# Patient Record
Sex: FEMALE | Race: WHITE | Employment: OTHER | URBAN - METROPOLITAN AREA
[De-identification: names, ages, dates, MRNs, and addresses within clinical notes are randomized per-mention and may not be internally consistent; named-entity substitution may affect disease eponyms.]

---

## 2020-05-27 ENCOUNTER — OFFICE VISIT (OUTPATIENT)
Dept: HEMATOLOGY/ONCOLOGY | Facility: HOSPITAL | Age: 75
End: 2020-05-27
Attending: INTERNAL MEDICINE
Payer: MEDICARE

## 2020-05-27 VITALS
HEART RATE: 71 BPM | RESPIRATION RATE: 16 BRPM | TEMPERATURE: 97 F | DIASTOLIC BLOOD PRESSURE: 64 MMHG | BODY MASS INDEX: 25.27 KG/M2 | SYSTOLIC BLOOD PRESSURE: 112 MMHG | WEIGHT: 161 LBS | HEIGHT: 67 IN | OXYGEN SATURATION: 97 %

## 2020-05-27 DIAGNOSIS — C82.90 FOLLICULAR LYMPHOMA, UNSPECIFIED FOLLICULAR LYMPHOMA TYPE, UNSPECIFIED BODY REGION (HCC): Primary | ICD-10-CM

## 2020-05-27 PROCEDURE — 99204 OFFICE O/P NEW MOD 45 MIN: CPT | Performed by: INTERNAL MEDICINE

## 2020-05-27 RX ORDER — CHOLECALCIFEROL (VITAMIN D3) 125 MCG
CAPSULE ORAL
COMMUNITY

## 2020-05-27 RX ORDER — ROSUVASTATIN CALCIUM 10 MG/1
TABLET, COATED ORAL
COMMUNITY

## 2020-05-27 RX ORDER — AMOXICILLIN 250 MG
CAPSULE ORAL
COMMUNITY
End: 2020-05-27 | Stop reason: ALTCHOICE

## 2020-05-28 NOTE — CONSULTS
[facility]    PATIENT'S NAME: [facility]   CONSULTING PHYSICIAN: Laura Liang.  Uzma Nuñez MD   PATIENT ACCOUNT #: [de-identified] LOCATION: [address] [place] [address] RECORD #: [id number] YOB: 1945   CONSULTATION DATE: 05/27/2020       CANCER CE peripheral adenopathy. She is active and independent in her activities of daily living. She denies any bruising or bleeding. She is otherwise without complaints.     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 She is relatively asymptomatic, however, does have a borderline anemia.   It is difficult to say how much this is from her bone marrow involvement gi

## 2020-06-02 ENCOUNTER — TELEPHONE (OUTPATIENT)
Dept: HEMATOLOGY/ONCOLOGY | Facility: HOSPITAL | Age: 75
End: 2020-06-02

## 2020-06-02 NOTE — TELEPHONE ENCOUNTER
Savannah Suarez called and wanted to let Chris Heard and Dr. Shelia Gonzalez know that she has decided to stay with her original Dr. She thanks the Dr for taking the time to see her and the diagnosis.

## 2021-07-12 PROBLEM — R25.2 CRAMPS OF LOWER EXTREMITY: Status: ACTIVE | Noted: 2019-06-17

## 2021-07-12 PROBLEM — Z86.010 HISTORY OF COLONIC POLYPS: Status: ACTIVE | Noted: 2020-06-30

## 2021-07-12 PROBLEM — M48.061 SPINAL STENOSIS OF LUMBAR REGION: Status: ACTIVE | Noted: 2021-02-11

## 2021-07-12 PROBLEM — E78.2 MIXED HYPERLIPIDEMIA: Status: ACTIVE | Noted: 2019-06-17

## 2021-07-12 PROBLEM — M25.511 PAIN IN JOINT OF RIGHT SHOULDER: Status: ACTIVE | Noted: 2019-02-25

## 2021-07-12 PROBLEM — M85.80 OSTEOPENIA: Status: ACTIVE | Noted: 2019-06-28

## 2021-07-12 PROBLEM — M79.604 PAIN OF RIGHT LOWER EXTREMITY: Status: ACTIVE | Noted: 2021-02-08

## 2021-07-12 PROBLEM — R31.9 HEMATURIA: Status: ACTIVE | Noted: 2019-06-11

## 2021-07-12 PROBLEM — M16.11 ARTHRITIS OF RIGHT HIP: Status: ACTIVE | Noted: 2021-05-10

## 2021-07-12 PROBLEM — I82.429 ILIAC VEIN THROMBOSIS (HCC): Status: ACTIVE | Noted: 2020-08-21

## 2021-07-12 PROBLEM — K11.8 MASS OF PAROTID GLAND: Status: ACTIVE | Noted: 2019-12-19

## 2021-07-12 PROBLEM — E55.9 VITAMIN D DEFICIENCY: Status: ACTIVE | Noted: 2019-06-17

## 2021-07-12 PROBLEM — C82.90 FOLLICULAR NON-HODGKIN'S LYMPHOMA (HCC): Status: ACTIVE | Noted: 2020-03-21

## 2021-07-12 PROBLEM — M16.11 PRIMARY OSTEOARTHRITIS OF RIGHT HIP: Status: ACTIVE | Noted: 2021-05-10

## 2021-07-12 PROBLEM — H92.09 PAIN OF EAR STRUCTURE: Status: ACTIVE | Noted: 2019-12-09

## 2021-07-12 PROBLEM — E53.8 COBALAMIN DEFICIENCY: Status: ACTIVE | Noted: 2019-06-17

## 2021-07-12 PROBLEM — K59.00 CONSTIPATION: Status: ACTIVE | Noted: 2021-05-10

## 2021-07-12 PROBLEM — K80.20 CHOLELITHIASIS WITHOUT OBSTRUCTION: Status: ACTIVE | Noted: 2021-04-13

## 2021-08-02 ENCOUNTER — LAB ENCOUNTER (OUTPATIENT)
Dept: LAB | Facility: HOSPITAL | Age: 76
End: 2021-08-02
Attending: ORTHOPAEDIC SURGERY
Payer: MEDICARE

## 2021-08-02 ENCOUNTER — HOSPITAL ENCOUNTER (OUTPATIENT)
Dept: GENERAL RADIOLOGY | Facility: HOSPITAL | Age: 76
Discharge: HOME | End: 2021-08-02
Attending: ORTHOPAEDIC SURGERY
Payer: MEDICARE

## 2021-08-02 DIAGNOSIS — Z01.818 PREOP TESTING: ICD-10-CM

## 2021-08-02 DIAGNOSIS — M16.11 PRIMARY OSTEOARTHRITIS OF RIGHT HIP: ICD-10-CM

## 2021-08-02 LAB
ALBUMIN SERPL-MCNC: 3.5 G/DL (ref 3.4–5)
ALBUMIN/GLOB SERPL: 0.9 {RATIO} (ref 1–2)
ALP LIVER SERPL-CCNC: 70 U/L
ALT SERPL-CCNC: 14 U/L
ANION GAP SERPL CALC-SCNC: 6 MMOL/L (ref 0–18)
ANTIBODY SCREEN: NEGATIVE
APTT PPP: 36.5 SECONDS (ref 23.2–35.3)
AST SERPL-CCNC: 9 U/L (ref 15–37)
BASOPHILS # BLD AUTO: 0.07 X10(3) UL (ref 0–0.2)
BASOPHILS NFR BLD AUTO: 0.6 %
BILIRUB SERPL-MCNC: 0.4 MG/DL (ref 0.1–2)
BILIRUB UR QL: NEGATIVE
BUN BLD-MCNC: 14 MG/DL (ref 7–18)
BUN/CREAT SERPL: 18.9 (ref 10–20)
CALCIUM BLD-MCNC: 9.9 MG/DL (ref 8.5–10.1)
CHLORIDE SERPL-SCNC: 108 MMOL/L (ref 98–112)
CO2 SERPL-SCNC: 26 MMOL/L (ref 21–32)
COLOR UR: YELLOW
CREAT BLD-MCNC: 0.74 MG/DL
DEPRECATED RDW RBC AUTO: 46.7 FL (ref 35.1–46.3)
EOSINOPHIL # BLD AUTO: 0.12 X10(3) UL (ref 0–0.7)
EOSINOPHIL NFR BLD AUTO: 1.1 %
ERYTHROCYTE [DISTWIDTH] IN BLOOD BY AUTOMATED COUNT: 16.7 % (ref 11–15)
GLOBULIN PLAS-MCNC: 4 G/DL (ref 2.8–4.4)
GLUCOSE BLD-MCNC: 101 MG/DL (ref 70–99)
GLUCOSE UR-MCNC: NEGATIVE MG/DL
HCT VFR BLD AUTO: 34 %
HGB BLD-MCNC: 10.6 G/DL
IMM GRANULOCYTES # BLD AUTO: 0.08 X10(3) UL (ref 0–1)
IMM GRANULOCYTES NFR BLD: 0.7 %
INR BLD: 1.14 (ref 0.9–1.2)
KETONES UR-MCNC: NEGATIVE MG/DL
LYMPHOCYTES # BLD AUTO: 1.97 X10(3) UL (ref 1–4)
LYMPHOCYTES NFR BLD AUTO: 18.2 %
M PROTEIN MFR SERPL ELPH: 7.5 G/DL (ref 6.4–8.2)
MCH RBC QN AUTO: 24.9 PG (ref 26–34)
MCHC RBC AUTO-ENTMCNC: 31.2 G/DL (ref 31–37)
MCV RBC AUTO: 79.8 FL
MONOCYTES # BLD AUTO: 0.79 X10(3) UL (ref 0.1–1)
MONOCYTES NFR BLD AUTO: 7.3 %
MRSA DNA SPEC QL NAA+PROBE: NEGATIVE
NEUTROPHILS # BLD AUTO: 7.78 X10 (3) UL (ref 1.5–7.7)
NEUTROPHILS # BLD AUTO: 7.78 X10(3) UL (ref 1.5–7.7)
NEUTROPHILS NFR BLD AUTO: 72.1 %
NITRITE UR QL STRIP.AUTO: NEGATIVE
OSMOLALITY SERPL CALC.SUM OF ELEC: 291 MOSM/KG (ref 275–295)
PATIENT FASTING Y/N/NP: YES
PH UR: 6 [PH] (ref 5–8)
PLATELET # BLD AUTO: 456 10(3)UL (ref 150–450)
POTASSIUM SERPL-SCNC: 4.3 MMOL/L (ref 3.5–5.1)
PROT UR-MCNC: 30 MG/DL
PROTHROMBIN TIME: 14.4 SECONDS (ref 11.8–14.5)
RBC # BLD AUTO: 4.26 X10(6)UL
RBC #/AREA URNS AUTO: >10 /HPF
RH BLOOD TYPE: POSITIVE
SODIUM SERPL-SCNC: 140 MMOL/L (ref 136–145)
SP GR UR STRIP: 1.02 (ref 1–1.03)
UROBILINOGEN UR STRIP-ACNC: 2
WBC # BLD AUTO: 10.8 X10(3) UL (ref 4–11)
WBC #/AREA URNS AUTO: >50 /HPF
WBC CLUMPS UR QL AUTO: PRESENT /HPF

## 2021-08-02 PROCEDURE — 87186 SC STD MICRODIL/AGAR DIL: CPT

## 2021-08-02 PROCEDURE — 87086 URINE CULTURE/COLONY COUNT: CPT

## 2021-08-02 PROCEDURE — 86850 RBC ANTIBODY SCREEN: CPT

## 2021-08-02 PROCEDURE — 85730 THROMBOPLASTIN TIME PARTIAL: CPT

## 2021-08-02 PROCEDURE — 86901 BLOOD TYPING SEROLOGIC RH(D): CPT

## 2021-08-02 PROCEDURE — 93010 ELECTROCARDIOGRAM REPORT: CPT | Performed by: ORTHOPAEDIC SURGERY

## 2021-08-02 PROCEDURE — 87641 MR-STAPH DNA AMP PROBE: CPT

## 2021-08-02 PROCEDURE — 71046 X-RAY EXAM CHEST 2 VIEWS: CPT | Performed by: ORTHOPAEDIC SURGERY

## 2021-08-02 PROCEDURE — 85025 COMPLETE CBC W/AUTO DIFF WBC: CPT

## 2021-08-02 PROCEDURE — 81001 URINALYSIS AUTO W/SCOPE: CPT

## 2021-08-02 PROCEDURE — 87077 CULTURE AEROBIC IDENTIFY: CPT

## 2021-08-02 PROCEDURE — 85610 PROTHROMBIN TIME: CPT

## 2021-08-02 PROCEDURE — 86900 BLOOD TYPING SEROLOGIC ABO: CPT

## 2021-08-02 PROCEDURE — 93005 ELECTROCARDIOGRAM TRACING: CPT

## 2021-08-02 PROCEDURE — 36415 COLL VENOUS BLD VENIPUNCTURE: CPT

## 2021-08-02 PROCEDURE — 80053 COMPREHEN METABOLIC PANEL: CPT

## 2021-08-10 NOTE — PAT NURSING NOTE
Called dr. Cheng Gutierrez to  [facility]amad[facility] Chew she sent already result of Urine culture(abnormal) to PMD.

## 2021-08-15 ENCOUNTER — LAB ENCOUNTER (OUTPATIENT)
Dept: LAB | Facility: HOSPITAL | Age: 76
DRG: 470 | End: 2021-08-15
Attending: ORTHOPAEDIC SURGERY
Payer: MEDICARE

## 2021-08-15 DIAGNOSIS — Z01.818 PREOP TESTING: ICD-10-CM

## 2021-08-16 LAB — SARS-COV-2 RNA RESP QL NAA+PROBE: NOT DETECTED

## 2021-08-17 ENCOUNTER — ANESTHESIA EVENT (OUTPATIENT)
Dept: SURGERY | Facility: HOSPITAL | Age: 76
DRG: 470 | End: 2021-08-17
Payer: MEDICARE

## 2021-08-18 ENCOUNTER — APPOINTMENT (OUTPATIENT)
Dept: GENERAL RADIOLOGY | Facility: HOSPITAL | Age: 76
DRG: 470 | End: 2021-08-18
Attending: ORTHOPAEDIC SURGERY
Payer: MEDICARE

## 2021-08-18 ENCOUNTER — ANESTHESIA (OUTPATIENT)
Dept: SURGERY | Facility: HOSPITAL | Age: 76
DRG: 470 | End: 2021-08-18
Payer: MEDICARE

## 2021-08-18 ENCOUNTER — HOSPITAL ENCOUNTER (INPATIENT)
Facility: HOSPITAL | Age: 76
LOS: 5 days | Discharge: NURSING, REHAB OR LONG-TERM CARE | DRG: 470 | End: 2021-08-23
Attending: ORTHOPAEDIC SURGERY | Admitting: ORTHOPAEDIC SURGERY
Payer: MEDICARE

## 2021-08-18 DIAGNOSIS — Z01.818 PREOP TESTING: Primary | ICD-10-CM

## 2021-08-18 PROBLEM — E78.5 HYPERLIPIDEMIA: Status: ACTIVE | Noted: 2019-06-17

## 2021-08-18 PROBLEM — M16.11 OSTEOARTHRITIS OF RIGHT HIP: Status: ACTIVE | Noted: 2021-05-10

## 2021-08-18 PROBLEM — M16.11 PRIMARY OSTEOARTHRITIS OF RIGHT HIP: Status: ACTIVE | Noted: 2021-08-18

## 2021-08-18 LAB
DEPRECATED RDW RBC AUTO: 53.8 FL (ref 35.1–46.3)
ERYTHROCYTE [DISTWIDTH] IN BLOOD BY AUTOMATED COUNT: 17.7 % (ref 11–15)
HCT VFR BLD AUTO: 31.6 %
HGB BLD-MCNC: 9.5 G/DL
MCH RBC QN AUTO: 24.9 PG (ref 26–34)
MCHC RBC AUTO-ENTMCNC: 30.1 G/DL (ref 31–37)
MCV RBC AUTO: 82.9 FL
PLATELET # BLD AUTO: 389 10(3)UL (ref 150–450)
RBC # BLD AUTO: 3.81 X10(6)UL
WBC # BLD AUTO: 12.3 X10(3) UL (ref 4–11)

## 2021-08-18 PROCEDURE — 0SR90JZ REPLACEMENT OF RIGHT HIP JOINT WITH SYNTHETIC SUBSTITUTE, OPEN APPROACH: ICD-10-PCS | Performed by: ORTHOPAEDIC SURGERY

## 2021-08-18 PROCEDURE — 73501 X-RAY EXAM HIP UNI 1 VIEW: CPT | Performed by: ORTHOPAEDIC SURGERY

## 2021-08-18 PROCEDURE — 99232 SBSQ HOSP IP/OBS MODERATE 35: CPT | Performed by: HOSPITALIST

## 2021-08-18 RX ORDER — OXYCODONE HYDROCHLORIDE 5 MG/1
2.5 TABLET ORAL EVERY 4 HOURS PRN
Status: DISCONTINUED | OUTPATIENT
Start: 2021-08-18 | End: 2021-08-23

## 2021-08-18 RX ORDER — DOCUSATE SODIUM 100 MG/1
100 CAPSULE, LIQUID FILLED ORAL 2 TIMES DAILY
Status: DISCONTINUED | OUTPATIENT
Start: 2021-08-18 | End: 2021-08-23

## 2021-08-18 RX ORDER — SENNOSIDES 8.6 MG
17.2 TABLET ORAL NIGHTLY
Status: DISCONTINUED | OUTPATIENT
Start: 2021-08-18 | End: 2021-08-23

## 2021-08-18 RX ORDER — EPHEDRINE SULFATE 50 MG/ML
INJECTION, SOLUTION INTRAVENOUS AS NEEDED
Status: DISCONTINUED | OUTPATIENT
Start: 2021-08-18 | End: 2021-08-18 | Stop reason: SURG

## 2021-08-18 RX ORDER — ACETAMINOPHEN 500 MG
1000 TABLET ORAL ONCE
Status: COMPLETED | OUTPATIENT
Start: 2021-08-18 | End: 2021-08-18

## 2021-08-18 RX ORDER — SODIUM CHLORIDE, SODIUM LACTATE, POTASSIUM CHLORIDE, CALCIUM CHLORIDE 600; 310; 30; 20 MG/100ML; MG/100ML; MG/100ML; MG/100ML
INJECTION, SOLUTION INTRAVENOUS CONTINUOUS
Status: DISCONTINUED | OUTPATIENT
Start: 2021-08-18 | End: 2021-08-23

## 2021-08-18 RX ORDER — HYDROCODONE BITARTRATE AND ACETAMINOPHEN 5; 325 MG/1; MG/1
1 TABLET ORAL AS NEEDED
Status: DISCONTINUED | OUTPATIENT
Start: 2021-08-18 | End: 2021-08-18 | Stop reason: HOSPADM

## 2021-08-18 RX ORDER — SODIUM PHOSPHATE, DIBASIC AND SODIUM PHOSPHATE, MONOBASIC 7; 19 G/133ML; G/133ML
1 ENEMA RECTAL ONCE AS NEEDED
Status: DISCONTINUED | OUTPATIENT
Start: 2021-08-18 | End: 2021-08-23

## 2021-08-18 RX ORDER — HYDROMORPHONE HYDROCHLORIDE 1 MG/ML
0.2 INJECTION, SOLUTION INTRAMUSCULAR; INTRAVENOUS; SUBCUTANEOUS EVERY 2 HOUR PRN
Status: DISCONTINUED | OUTPATIENT
Start: 2021-08-18 | End: 2021-08-23

## 2021-08-18 RX ORDER — FAMOTIDINE 10 MG/ML
20 INJECTION, SOLUTION INTRAVENOUS 2 TIMES DAILY
Status: DISCONTINUED | OUTPATIENT
Start: 2021-08-18 | End: 2021-08-22

## 2021-08-18 RX ORDER — HYDROMORPHONE HYDROCHLORIDE 1 MG/ML
0.6 INJECTION, SOLUTION INTRAMUSCULAR; INTRAVENOUS; SUBCUTANEOUS EVERY 5 MIN PRN
Status: DISCONTINUED | OUTPATIENT
Start: 2021-08-18 | End: 2021-08-18 | Stop reason: HOSPADM

## 2021-08-18 RX ORDER — ROCURONIUM BROMIDE 10 MG/ML
INJECTION, SOLUTION INTRAVENOUS AS NEEDED
Status: DISCONTINUED | OUTPATIENT
Start: 2021-08-18 | End: 2021-08-18 | Stop reason: SURG

## 2021-08-18 RX ORDER — PROCHLORPERAZINE EDISYLATE 5 MG/ML
10 INJECTION INTRAMUSCULAR; INTRAVENOUS EVERY 6 HOURS PRN
Status: ACTIVE | OUTPATIENT
Start: 2021-08-18 | End: 2021-08-20

## 2021-08-18 RX ORDER — ALPRAZOLAM 0.5 MG/1
0.25 TABLET ORAL 2 TIMES DAILY PRN
Status: DISCONTINUED | OUTPATIENT
Start: 2021-08-18 | End: 2021-08-23

## 2021-08-18 RX ORDER — OXYCODONE HYDROCHLORIDE 5 MG/1
5 TABLET ORAL EVERY 4 HOURS PRN
Status: DISCONTINUED | OUTPATIENT
Start: 2021-08-18 | End: 2021-08-23

## 2021-08-18 RX ORDER — HYDROMORPHONE HYDROCHLORIDE 1 MG/ML
0.2 INJECTION, SOLUTION INTRAMUSCULAR; INTRAVENOUS; SUBCUTANEOUS EVERY 5 MIN PRN
Status: DISCONTINUED | OUTPATIENT
Start: 2021-08-18 | End: 2021-08-18 | Stop reason: HOSPADM

## 2021-08-18 RX ORDER — GLYCOPYRROLATE 0.2 MG/ML
INJECTION, SOLUTION INTRAMUSCULAR; INTRAVENOUS AS NEEDED
Status: DISCONTINUED | OUTPATIENT
Start: 2021-08-18 | End: 2021-08-18 | Stop reason: SURG

## 2021-08-18 RX ORDER — MORPHINE SULFATE 4 MG/ML
4 INJECTION, SOLUTION INTRAMUSCULAR; INTRAVENOUS EVERY 10 MIN PRN
Status: DISCONTINUED | OUTPATIENT
Start: 2021-08-18 | End: 2021-08-18 | Stop reason: HOSPADM

## 2021-08-18 RX ORDER — MECLIZINE HYDROCHLORIDE 25 MG/1
25 TABLET ORAL 3 TIMES DAILY PRN
Status: DISCONTINUED | OUTPATIENT
Start: 2021-08-18 | End: 2021-08-23

## 2021-08-18 RX ORDER — ONDANSETRON 2 MG/ML
4 INJECTION INTRAMUSCULAR; INTRAVENOUS ONCE AS NEEDED
Status: COMPLETED | OUTPATIENT
Start: 2021-08-18 | End: 2021-08-18

## 2021-08-18 RX ORDER — LIDOCAINE HYDROCHLORIDE 10 MG/ML
INJECTION, SOLUTION EPIDURAL; INFILTRATION; INTRACAUDAL; PERINEURAL AS NEEDED
Status: DISCONTINUED | OUTPATIENT
Start: 2021-08-18 | End: 2021-08-18 | Stop reason: SURG

## 2021-08-18 RX ORDER — POLYETHYLENE GLYCOL 3350 17 G/17G
17 POWDER, FOR SOLUTION ORAL DAILY PRN
Status: DISCONTINUED | OUTPATIENT
Start: 2021-08-18 | End: 2021-08-23

## 2021-08-18 RX ORDER — DIPHENHYDRAMINE HYDROCHLORIDE 50 MG/ML
12.5 INJECTION INTRAMUSCULAR; INTRAVENOUS EVERY 4 HOURS PRN
Status: DISCONTINUED | OUTPATIENT
Start: 2021-08-18 | End: 2021-08-23

## 2021-08-18 RX ORDER — BISACODYL 10 MG
10 SUPPOSITORY, RECTAL RECTAL
Status: DISCONTINUED | OUTPATIENT
Start: 2021-08-18 | End: 2021-08-23

## 2021-08-18 RX ORDER — ZOLPIDEM TARTRATE 5 MG/1
5 TABLET ORAL NIGHTLY PRN
Status: DISCONTINUED | OUTPATIENT
Start: 2021-08-18 | End: 2021-08-23

## 2021-08-18 RX ORDER — ONDANSETRON 2 MG/ML
4 INJECTION INTRAMUSCULAR; INTRAVENOUS EVERY 4 HOURS PRN
Status: DISCONTINUED | OUTPATIENT
Start: 2021-08-18 | End: 2021-08-23

## 2021-08-18 RX ORDER — PROCHLORPERAZINE EDISYLATE 5 MG/ML
5 INJECTION INTRAMUSCULAR; INTRAVENOUS ONCE AS NEEDED
Status: DISCONTINUED | OUTPATIENT
Start: 2021-08-18 | End: 2021-08-18 | Stop reason: HOSPADM

## 2021-08-18 RX ORDER — FAMOTIDINE 20 MG/1
20 TABLET ORAL 2 TIMES DAILY
Status: DISCONTINUED | OUTPATIENT
Start: 2021-08-18 | End: 2021-08-22

## 2021-08-18 RX ORDER — ONDANSETRON 2 MG/ML
INJECTION INTRAMUSCULAR; INTRAVENOUS AS NEEDED
Status: DISCONTINUED | OUTPATIENT
Start: 2021-08-18 | End: 2021-08-18 | Stop reason: SURG

## 2021-08-18 RX ORDER — NEOSTIGMINE METHYLSULFATE 1 MG/ML
INJECTION INTRAVENOUS AS NEEDED
Status: DISCONTINUED | OUTPATIENT
Start: 2021-08-18 | End: 2021-08-18 | Stop reason: SURG

## 2021-08-18 RX ORDER — HYDROMORPHONE HYDROCHLORIDE 1 MG/ML
0.4 INJECTION, SOLUTION INTRAMUSCULAR; INTRAVENOUS; SUBCUTANEOUS EVERY 2 HOUR PRN
Status: DISCONTINUED | OUTPATIENT
Start: 2021-08-18 | End: 2021-08-23

## 2021-08-18 RX ORDER — ROSUVASTATIN CALCIUM 10 MG/1
10 TABLET, COATED ORAL NIGHTLY
Status: DISCONTINUED | OUTPATIENT
Start: 2021-08-18 | End: 2021-08-23

## 2021-08-18 RX ORDER — OXYCODONE HYDROCHLORIDE AND ACETAMINOPHEN 5; 325 MG/1; MG/1
2 TABLET ORAL EVERY 4 HOURS PRN
Status: DISCONTINUED | OUTPATIENT
Start: 2021-08-18 | End: 2021-08-23

## 2021-08-18 RX ORDER — HYDROCODONE BITARTRATE AND ACETAMINOPHEN 5; 325 MG/1; MG/1
2 TABLET ORAL AS NEEDED
Status: DISCONTINUED | OUTPATIENT
Start: 2021-08-18 | End: 2021-08-18 | Stop reason: HOSPADM

## 2021-08-18 RX ORDER — NALOXONE HYDROCHLORIDE 0.4 MG/ML
80 INJECTION, SOLUTION INTRAMUSCULAR; INTRAVENOUS; SUBCUTANEOUS AS NEEDED
Status: DISCONTINUED | OUTPATIENT
Start: 2021-08-18 | End: 2021-08-18 | Stop reason: HOSPADM

## 2021-08-18 RX ORDER — TRANEXAMIC ACID 10 MG/ML
INJECTION, SOLUTION INTRAVENOUS AS NEEDED
Status: DISCONTINUED | OUTPATIENT
Start: 2021-08-18 | End: 2021-08-18 | Stop reason: SURG

## 2021-08-18 RX ORDER — SODIUM CHLORIDE 9 MG/ML
INJECTION, SOLUTION INTRAVENOUS CONTINUOUS PRN
Status: DISCONTINUED | OUTPATIENT
Start: 2021-08-18 | End: 2021-08-18 | Stop reason: SURG

## 2021-08-18 RX ORDER — HALOPERIDOL 5 MG/ML
0.25 INJECTION INTRAMUSCULAR ONCE AS NEEDED
Status: DISCONTINUED | OUTPATIENT
Start: 2021-08-18 | End: 2021-08-18 | Stop reason: HOSPADM

## 2021-08-18 RX ORDER — CLINDAMYCIN PHOSPHATE 150 MG/ML
INJECTION, SOLUTION INTRAVENOUS AS NEEDED
Status: DISCONTINUED | OUTPATIENT
Start: 2021-08-18 | End: 2021-08-18 | Stop reason: SURG

## 2021-08-18 RX ORDER — SODIUM CHLORIDE, SODIUM LACTATE, POTASSIUM CHLORIDE, CALCIUM CHLORIDE 600; 310; 30; 20 MG/100ML; MG/100ML; MG/100ML; MG/100ML
INJECTION, SOLUTION INTRAVENOUS CONTINUOUS
Status: DISCONTINUED | OUTPATIENT
Start: 2021-08-18 | End: 2021-08-18 | Stop reason: HOSPADM

## 2021-08-18 RX ORDER — SODIUM CHLORIDE 9 MG/ML
INJECTION, SOLUTION INTRAVENOUS CONTINUOUS
Status: DISCONTINUED | OUTPATIENT
Start: 2021-08-18 | End: 2021-08-23

## 2021-08-18 RX ORDER — DIPHENHYDRAMINE HYDROCHLORIDE 50 MG/ML
25 INJECTION INTRAMUSCULAR; INTRAVENOUS ONCE AS NEEDED
Status: ACTIVE | OUTPATIENT
Start: 2021-08-18 | End: 2021-08-18

## 2021-08-18 RX ORDER — MORPHINE SULFATE 4 MG/ML
2 INJECTION, SOLUTION INTRAMUSCULAR; INTRAVENOUS EVERY 10 MIN PRN
Status: DISCONTINUED | OUTPATIENT
Start: 2021-08-18 | End: 2021-08-18 | Stop reason: HOSPADM

## 2021-08-18 RX ORDER — HYDROMORPHONE HYDROCHLORIDE 1 MG/ML
0.4 INJECTION, SOLUTION INTRAMUSCULAR; INTRAVENOUS; SUBCUTANEOUS EVERY 5 MIN PRN
Status: DISCONTINUED | OUTPATIENT
Start: 2021-08-18 | End: 2021-08-18 | Stop reason: HOSPADM

## 2021-08-18 RX ORDER — OXYCODONE HYDROCHLORIDE AND ACETAMINOPHEN 5; 325 MG/1; MG/1
1 TABLET ORAL EVERY 4 HOURS PRN
Status: DISCONTINUED | OUTPATIENT
Start: 2021-08-18 | End: 2021-08-23

## 2021-08-18 RX ORDER — MORPHINE SULFATE 10 MG/ML
6 INJECTION, SOLUTION INTRAMUSCULAR; INTRAVENOUS EVERY 10 MIN PRN
Status: DISCONTINUED | OUTPATIENT
Start: 2021-08-18 | End: 2021-08-18 | Stop reason: HOSPADM

## 2021-08-18 RX ORDER — DEXAMETHASONE SODIUM PHOSPHATE 4 MG/ML
VIAL (ML) INJECTION AS NEEDED
Status: DISCONTINUED | OUTPATIENT
Start: 2021-08-18 | End: 2021-08-18 | Stop reason: SURG

## 2021-08-18 RX ORDER — NALOXONE HYDROCHLORIDE 0.4 MG/ML
0.08 INJECTION, SOLUTION INTRAMUSCULAR; INTRAVENOUS; SUBCUTANEOUS
Status: DISCONTINUED | OUTPATIENT
Start: 2021-08-18 | End: 2021-08-23

## 2021-08-18 RX ORDER — ONDANSETRON 2 MG/ML
4 INJECTION INTRAMUSCULAR; INTRAVENOUS EVERY 6 HOURS PRN
Status: DISCONTINUED | OUTPATIENT
Start: 2021-08-18 | End: 2021-08-23

## 2021-08-18 RX ORDER — DIPHENHYDRAMINE HCL 25 MG
25 CAPSULE ORAL EVERY 4 HOURS PRN
Status: DISCONTINUED | OUTPATIENT
Start: 2021-08-18 | End: 2021-08-23

## 2021-08-18 RX ADMIN — CLINDAMYCIN PHOSPHATE 900 MG: 150 INJECTION, SOLUTION INTRAVENOUS at 08:00:00

## 2021-08-18 RX ADMIN — ONDANSETRON 4 MG: 2 INJECTION INTRAMUSCULAR; INTRAVENOUS at 09:44:00

## 2021-08-18 RX ADMIN — SODIUM CHLORIDE, SODIUM LACTATE, POTASSIUM CHLORIDE, CALCIUM CHLORIDE: 600; 310; 30; 20 INJECTION, SOLUTION INTRAVENOUS at 07:34:00

## 2021-08-18 RX ADMIN — ROCURONIUM BROMIDE 40 MG: 10 INJECTION, SOLUTION INTRAVENOUS at 07:41:00

## 2021-08-18 RX ADMIN — GLYCOPYRROLATE 0.5 MG: 0.2 INJECTION, SOLUTION INTRAMUSCULAR; INTRAVENOUS at 09:49:00

## 2021-08-18 RX ADMIN — EPHEDRINE SULFATE 5 MG: 50 INJECTION, SOLUTION INTRAVENOUS at 09:07:00

## 2021-08-18 RX ADMIN — TRANEXAMIC ACID 1000 MG: 10 INJECTION, SOLUTION INTRAVENOUS at 08:00:00

## 2021-08-18 RX ADMIN — DEXAMETHASONE SODIUM PHOSPHATE 4 MG: 4 MG/ML VIAL (ML) INJECTION at 07:45:00

## 2021-08-18 RX ADMIN — SODIUM CHLORIDE: 9 INJECTION, SOLUTION INTRAVENOUS at 10:08:00

## 2021-08-18 RX ADMIN — SODIUM CHLORIDE: 9 INJECTION, SOLUTION INTRAVENOUS at 07:44:00

## 2021-08-18 RX ADMIN — NEOSTIGMINE METHYLSULFATE 3 MG: 1 INJECTION INTRAVENOUS at 09:49:00

## 2021-08-18 RX ADMIN — LIDOCAINE HYDROCHLORIDE 100 MG: 10 INJECTION, SOLUTION EPIDURAL; INFILTRATION; INTRACAUDAL; PERINEURAL at 07:39:00

## 2021-08-18 RX ADMIN — TRANEXAMIC ACID 1000 MG: 10 INJECTION, SOLUTION INTRAVENOUS at 09:44:00

## 2021-08-18 NOTE — PROGRESS NOTES
During registration Pt requested a  visit . During that visit the pt shared that she was not feeling well . She elaborated on feeling exhausted, dizzy and having a headache. All of her concerns were in the normal range in regards to the type of surg

## 2021-08-18 NOTE — ANESTHESIA PROCEDURE NOTES
Peripheral IV  Date/Time: 8/18/2021 7:45 AM  Inserted by: Everett Calderon, CRNA    Placement  Needle size: 20 G  Laterality: right  Location: forearm  Local anesthetic: none  Site prep: alcohol  Technique: anatomical landmarks  Attempts: 1

## 2021-08-18 NOTE — ANESTHESIA POSTPROCEDURE EVALUATION
Patient: Jarad Hernández    Procedure Summary     Date: 08/18/21 Room / Location: [address] [place] [address] MAIN OR 12 / [address] [place] [address] MAIN OR    Anesthesia Start: 0214 Anesthesia Stop: 7105    Procedure: right total hip arthroplasty (Right Hip) Diagnosis: (osteoathritis right hip)    S

## 2021-08-18 NOTE — CM/SW NOTE
MARIANA received MDO for Surgery     SW received email from [facility] stating pt is pre ortho arranged with [facility] prior to surgery by the surgeon's office.      PT/OT recommendations are Home Health PT/OT      [facility] will follow up post op to discuss Oni GAVIN

## 2021-08-18 NOTE — PHYSICAL THERAPY NOTE
PT Orders received and pt chart reviewed. Per RN, pt is dizzy and not appropriate for PT evaluation at this time. Will check back later this PM for appropriateness.  Thank you,    Yolanda Berry, PT  8/18/2021

## 2021-08-18 NOTE — PLAN OF CARE
New admit to shift. Patient a/o x4. Vital signs stable, cms intact. Room air. Complaining of dizziness, headache and nausea. MD Aware. Antivert given. No relief. MD Aware. No complaints of pain. PCA pump running. Patient educated on how to use PCA.  Current

## 2021-08-18 NOTE — ANESTHESIA PROCEDURE NOTES
Airway  Date/Time: 8/18/2021 7:42 AM  Urgency: Elective    Airway not difficult    General Information and Staff    Patient location during procedure: OR  Anesthesiologist: Lg Escalante MD  Resident/CRNA: Mirtha Calderon CRNA  Performed: CRNA     Indic

## 2021-08-18 NOTE — PROGRESS NOTES
[facility]OSP - [facility]    Progress Note    Drema Sánchez Patient Status:  Inpatient    1945 MRN [id number]   Location [address] UNIT Attending Margie Rios MD   Hosp Day # 0 PCP Alan Lawson MD 08/02/2021    HGB 10.6 (L) 08/02/2021    HCT 34.0 (L) 08/02/2021    .0 (H) 08/02/2021    CREATSERUM 0.74 08/02/2021    BUN 14 08/02/2021     08/02/2021    K 4.3 08/02/2021     08/02/2021    CO2 26.0 08/02/2021     (H) 08/02/2021

## 2021-08-18 NOTE — ANESTHESIA PREPROCEDURE EVALUATION
Anesthesia PreOp Note    HPI:     Toan Beach is a 66year old female who presents for preoperative consultation requested by: Karuna Rodriguez MD    Date of Surgery: 8/18/2021    Procedure(s):  right total hip arthroplasty  Indication: osteoathriti 2 (two) times daily. , Disp: , Rfl:   acetaminophen 500 MG Oral Tab, Take 500 mg by mouth every 6 (six) hours as needed for Pain., Disp: , Rfl:   Rosuvastatin Calcium 10 MG Oral Tab, rosuvastatin 10 mg tablet   TAKE ONE TABLET BY MOUTH ONE TIME DAILY, Disp: Social History Narrative      Not on file    Social Determinants of Health  Financial Resource Strain:       Difficulty of Paying Living Expenses:   Food Insecurity:       Worried About Running Out of Food in the Last Year:       Ran Out of Food in the [facility] asthma  Cardiovascular   (-) hypertension, CAD, CHF    ECG reviewed  Rhythm: regular  Rate: normal  ROS comment: Chronic RLE DVT.  On eliquis, last dose Sunday pm (<72 hrs)    Neuro/Psych - negative ROS   (-) CVA    GI/Hepatic/Renal - negative ROS     Endo/

## 2021-08-18 NOTE — H&P
HPI:   Cathie Skylar:  Migel Rosales presents is a 25-year-old white female who is here for second opinion she has been experiencing progressive pain and stiffness of her right hip and groin area for the past 6 months without any history of any identifiable t intake form and reviewed by me today with pertinent positives and negatives listed in the HPI. GENERAL HEALTH: denies weight loss of significance, denies fatigue, fever or chills.   HENT: denies blurry vision, double vision, hearing ailments, dysphagia, od and regular. · There is definitive decreased range of motion in the right hip, particularly with regards to hip flexion, abduction and rotation. Minimal decreased range of motion noted on the contralateral hip.       · No focal tenderness about the hip HIP W OR WO PELVIS 2 OR 3 VIEWS, RIGHT (CPT=73502)     The patient’s symptoms have been  progressive, unresponsive to conservative treatment, and interfering with the ability to perform their normal activities of daily living.  An extensive discussion regar Plavix, fish oil, steroids, etc.); neurovascular injury including sciatic stretching injury and footdrop; the need and risk of a blood transfusion despite the use of autologous drain and/or cell saver; the potential need for prolonged protective weightbear anti-inflammatory medication and activities as tolerated was recommended. Digital transcription software was utilized to produce this note. The note was proofread for content only. Typographical errors may remain.

## 2021-08-18 NOTE — BRIEF OP NOTE
[facility] [address]   Brief Op Note    Patients Name: Cathie Cheng  Attending Physician: Charli Manley MD  Operating Physician: Michell You MD  CSN: [id number]     Location:  OR  MRN: [id number]    YOB: 1945  Admis

## 2021-08-18 NOTE — PHYSICAL THERAPY NOTE
PHYSICAL THERAPY HIP EVALUATION - INPATIENT     Room Number: 407/407-A  Evaluation Date: 8/18/2021  Type of Evaluation: Initial  Physician Order: PT Eval and Treat    Presenting Problem: s/p R REANNA - posterior approach  Reason for Therapy: Mobility Dysfunct patient's Approx Degree of Impairment: 46.58% has been calculated based on documentation in the [facility] '6 clicks' Inpatient Basic Mobility Short Form.   Research supports that patients with this level of impairment may benefit from [facility] PT with 24 hr assist fr abduction pillow;Limb alert - right  Fall Risk: Standard fall risk    WEIGHT BEARING RESTRICTION  Weight Bearing Restriction: R lower extremity        R Lower Extremity: Weight Bearing as Tolerated       PAIN ASSESSMENT  Ratin  Location: R hip  Managem tested       Exercise/Education Provided:  Bed mobility  Body mechanics  Functional activity tolerated  Posture  REANNA precautions    Patient End of Session: In bed;Needs met;Call light within reach;RN aware of session/findings;Bracing education provided; All

## 2021-08-18 NOTE — OPERATIVE REPORT
Operative Report     Pre-Operative Diagnosis: osteoathritis right hip     Post-Operative Diagnosis: Same as above.     Procedure Performed: right total hip arthroplasty      Anesthesia: General     Assistants: Surgical Assistant.: Susan Mackey hip.  Her work-up included an MRI OF THE RIGHT HIP performed at [facility] on Zee 3, 2021 the following radiologist impression:  1.  Severe right hip osteoarthritis  2.  Mild to moderate left hip osteoarthritis  3.  Bilateral hamstring groin tendin explained) requiring potential heel lift (with increased incidence in patients with scoliosis, contralateral arthritis and/or total joint arthroplasty, pelvic obliquity, etc.); the 2-8% incidence of hip dislocation, even at tertiary centers; mechanical loo loosening and/your periprosthetic fractures given history of osteoporosis was conveyed.  Moreover, it was explained that the hip replacement surgery would address specifically any pain emanating from her advanced osteoarthritis of the hip.  She may experie hemostasis was obtained with Bovie electrocautery.  The tensor fascia esvin and gluteus latonia fascia were incised.  The sciatic nerve was identified and protected throughout the case, and a self-retaining Charnley retractor was inserted.  The dissection w then turned to the femur, where a box osteotome was utilized, followed by a Charnley awl and a trochanteric reamer.  Sequential broaching was begun with a size 0 in the appropriate anteversion and progressing to a size 7 and an excellent tight fit was noted enough blood loss occurred for reinfusion with a approximately 150 cc estimated blood loss. . Tranexamic acid was begun at the beginning of the case and given again at this time and after copious irrigation with the Pulsavac antibiotic irrigation system, st

## 2021-08-19 LAB
ANION GAP SERPL CALC-SCNC: 6 MMOL/L (ref 0–18)
BASOPHILS # BLD AUTO: 0.02 X10(3) UL (ref 0–0.2)
BASOPHILS NFR BLD AUTO: 0.2 %
BUN BLD-MCNC: 13 MG/DL (ref 7–18)
BUN/CREAT SERPL: 12.6 (ref 10–20)
CALCIUM BLD-MCNC: 8.5 MG/DL (ref 8.5–10.1)
CHLORIDE SERPL-SCNC: 106 MMOL/L (ref 98–112)
CO2 SERPL-SCNC: 26 MMOL/L (ref 21–32)
CREAT BLD-MCNC: 1.03 MG/DL
DEPRECATED RDW RBC AUTO: 54.3 FL (ref 35.1–46.3)
EOSINOPHIL # BLD AUTO: 0.01 X10(3) UL (ref 0–0.7)
EOSINOPHIL NFR BLD AUTO: 0.1 %
ERYTHROCYTE [DISTWIDTH] IN BLOOD BY AUTOMATED COUNT: 18.4 % (ref 11–15)
GLUCOSE BLD-MCNC: 100 MG/DL (ref 70–99)
HCT VFR BLD AUTO: 27 %
HCT VFR BLD AUTO: 27.3 %
HGB BLD-MCNC: 8.2 G/DL
HGB BLD-MCNC: 8.6 G/DL
IMM GRANULOCYTES # BLD AUTO: 0.08 X10(3) UL (ref 0–1)
IMM GRANULOCYTES NFR BLD: 0.7 %
LYMPHOCYTES # BLD AUTO: 1.04 X10(3) UL (ref 1–4)
LYMPHOCYTES NFR BLD AUTO: 9.7 %
MCH RBC QN AUTO: 25.4 PG (ref 26–34)
MCHC RBC AUTO-ENTMCNC: 31.5 G/DL (ref 31–37)
MCV RBC AUTO: 80.8 FL
MONOCYTES # BLD AUTO: 1.03 X10(3) UL (ref 0.1–1)
MONOCYTES NFR BLD AUTO: 9.6 %
NEUTROPHILS # BLD AUTO: 8.57 X10 (3) UL (ref 1.5–7.7)
NEUTROPHILS # BLD AUTO: 8.57 X10(3) UL (ref 1.5–7.7)
NEUTROPHILS NFR BLD AUTO: 79.7 %
OSMOLALITY SERPL CALC.SUM OF ELEC: 286 MOSM/KG (ref 275–295)
PLATELET # BLD AUTO: 332 10(3)UL (ref 150–450)
POTASSIUM SERPL-SCNC: 4.4 MMOL/L (ref 3.5–5.1)
RBC # BLD AUTO: 3.38 X10(6)UL
SODIUM SERPL-SCNC: 138 MMOL/L (ref 136–145)
WBC # BLD AUTO: 10.8 X10(3) UL (ref 4–11)

## 2021-08-19 PROCEDURE — 99233 SBSQ HOSP IP/OBS HIGH 50: CPT | Performed by: HOSPITALIST

## 2021-08-19 RX ORDER — ACETAMINOPHEN 500 MG
1000 TABLET ORAL EVERY 8 HOURS PRN
Status: DISCONTINUED | OUTPATIENT
Start: 2021-08-19 | End: 2021-08-23

## 2021-08-19 NOTE — PLAN OF CARE
Patient a/o x4. Vital signs stable, cms intact. Room air. No signs of acute distress. Patient ambulating 1 assist with walker. General diet. Appetite decreased. MD Aware. Nutritional supplement given. Complaining of nausea. Denied medication.  PCA pump disc applicable  - Encourage broncho-pulmonary hygiene including cough, deep breathe, Incentive Spirometry  - Assess the need for suctioning and perform as needed  - Assess and instruct to report SOB or any respiratory difficulty  - Respiratory Therapy support fall injury  Description: INTERVENTIONS:  - Assess pt frequently for physical needs  - Identify cognitive and physical deficits and behaviors that affect risk of falls.   - Markesan fall precautions as indicated by assessment.  - Educate pt/family on patie

## 2021-08-19 NOTE — PROGRESS NOTES
[facility]    Progress Note    Octavio Watts Patient Status:  Inpatient    1945 MRN [id number]   Location [facility] 4W/SW/SE Attending Marcos Simmons MD   Hosp Day # 1 PCP Jayy Boyer MD     Date of Admission Oral, Daily PRN  bisacodyl (DULCOLAX) rectal suppository 10 mg, 10 mg, Rectal, Daily PRN  Fleet Enema (FLEET) 7-19 GM/118ML enema 133 mL, 1 enema, Rectal, Once PRN  ondansetron (ZOFRAN) injection 4 mg, 4 mg, Intravenous, Q4H PRN  Prochlorperazine Edisylate Medical History:  Past Medical History:   Diagnosis Date   • Diverticulosis of large intestine    • High cholesterol    • Non-Hodgkin lymphoma ([facility] [address]    • Osteoarthritis    • Visual impairment     glases      Surgical History:  Past Surgical History:   Proc components in anatomic position. 2. No adverse features. No acute fracture dislocation.       Dictated by (CST): Jessica Escalante MD on 8/18/2021 at 12:07 PM     Finalized by (CST): Jessica Escalante MD on 8/18/2021 at 12:09 PM                 ASSESSME

## 2021-08-19 NOTE — PHYSICAL THERAPY NOTE
PHYSICAL THERAPY HIP TREATMENT NOTE - INPATIENT    Room Number: 407/407-A       Number of Visits to Meet Established Goals: 0    Presenting Problem: s/p R REANNA - posterior approach    Problem List  Principal Problem:    Osteoarthritis of right hip  Active P Automatic  Patient Position: Sitting    O2 WALK       AM-PAC '6-Clicks' INPATIENT SHORT FORM - BASIC MOBILITY  How much difficulty does the patient currently have. ..  -   Turning over in bed (including adjusting bedclothes, sheets and blankets)?: A Little Goal #4   Current Status NT   Goal #5 Patient verbalizes and/or demonstrates all precautions and safety concerns independently   Goal #5   Current Status In progress   Goal #6 Patient independently performs home exercise program for ROM/strengthening per

## 2021-08-19 NOTE — OCCUPATIONAL THERAPY NOTE
OCCUPATIONAL THERAPY EVALUATION - INPATIENT      Room Number: 407/407-A  Evaluation Date: 8/19/2021  Type of Evaluation: Initial  Presenting Problem:  (R franco)    Physician Order: IP Consult to Occupational Therapy  Reason for Therapy: ADL/IADL Dysfunction supports that patients with this level of impairment may benefit from return to home w/ HH OT.    DISCHARGE RECOMMENDATIONS  OT Discharge Recommendations: Home with home health PT/OT;24 hour care/supervision  OT Device Recommendations: TBD    PLAN  OT Lana 4    RANGE OF MOTION   Upper extremity ROM is within functional limits     STRENGTH ASSESSMENT  Upper extremity strength is within functional limits     ACTIVITIES OF DAILY LIVING ASSESSMENT  AM-PAC ‘6-Clicks’ Inpatient Daily Activity Short Form  How much

## 2021-08-19 NOTE — DIETARY NOTE
ADULT NUTRITION INITIAL ASSESSMENT    Pt is at high nutrition risk. Pt meets severe malnutrition criteria.       CRITERIA FOR MALNUTRITION DIAGNOSIS:  Criteria for severe malnutrition diagnosis: chronic illness related to wt loss greater than 10% in 6 lori Nightly   • docusate sodium  100 mg Oral BID   • apixaban  2.5 mg Oral [email or web address]   • famoTIDine  20 mg Oral BID    Or   • famoTIDine  20 mg Intravenous BID     LABS: reviewed  Recent Labs     08/19/21  0653   *   BUN 13   CREATSERUM 1.03*   CA 8 Nutrition education: Discussed importance of increased food intake with supplementing with ONS vs using ONS as meal replacement    - Coordination of nutrition care: collaboration with other providers  - Discharge and transfer of nutrition care to [facility]

## 2021-08-19 NOTE — PROGRESS NOTES
[facility] [facility] - [facility]    Progress Note    Jr Garrido Patient Status:  Inpatient    1945 MRN [id number]   Location [facility] 4W/SW/SE Attending William Mosqueda MD   Hosp Day # 1 PCP Grecia Richmond MD       Subjective:    Lacho apixaban  2.5 mg Oral [email or web address]   • famoTIDine  20 mg Oral BID    Or   • famoTIDine  20 mg Intravenous BID     acetaminophen, sodium chloride 0.9%, PEG 3350, magnesium hydroxide, bisacodyl, Fleet Enema, ondansetron, Prochlorperazine Edisylate, diphenhy

## 2021-08-20 LAB
BASOPHILS # BLD AUTO: 0.02 X10(3) UL (ref 0–0.2)
BASOPHILS NFR BLD AUTO: 0.2 %
DEPRECATED RDW RBC AUTO: 54.7 FL (ref 35.1–46.3)
EOSINOPHIL # BLD AUTO: 0.04 X10(3) UL (ref 0–0.7)
EOSINOPHIL NFR BLD AUTO: 0.5 %
ERYTHROCYTE [DISTWIDTH] IN BLOOD BY AUTOMATED COUNT: 18.5 % (ref 11–15)
HCT VFR BLD AUTO: 27.5 %
HGB BLD-MCNC: 8.5 G/DL
IMM GRANULOCYTES # BLD AUTO: 0.06 X10(3) UL (ref 0–1)
IMM GRANULOCYTES NFR BLD: 0.7 %
LYMPHOCYTES # BLD AUTO: 1.09 X10(3) UL (ref 1–4)
LYMPHOCYTES NFR BLD AUTO: 12.9 %
MCH RBC QN AUTO: 25.1 PG (ref 26–34)
MCHC RBC AUTO-ENTMCNC: 30.9 G/DL (ref 31–37)
MCV RBC AUTO: 81.1 FL
MONOCYTES # BLD AUTO: 0.89 X10(3) UL (ref 0.1–1)
MONOCYTES NFR BLD AUTO: 10.5 %
NEUTROPHILS # BLD AUTO: 6.38 X10 (3) UL (ref 1.5–7.7)
NEUTROPHILS # BLD AUTO: 6.38 X10(3) UL (ref 1.5–7.7)
NEUTROPHILS NFR BLD AUTO: 75.2 %
PLATELET # BLD AUTO: 338 10(3)UL (ref 150–450)
RBC # BLD AUTO: 3.39 X10(6)UL
WBC # BLD AUTO: 8.5 X10(3) UL (ref 4–11)

## 2021-08-20 PROCEDURE — 99233 SBSQ HOSP IP/OBS HIGH 50: CPT | Performed by: HOSPITALIST

## 2021-08-20 NOTE — PROGRESS NOTES
[facility]    Progress Note    Annamary Skylar Patient Status:  Inpatient    1945 MRN [id number]   Location [facility] 4W/SW/SE Attending Carlos Boone MD   Hosp Day # 2 PCP Iram Bravo MD       Subjective:    Lacho • famoTIDine  20 mg Oral BID    Or   • famoTIDine  20 mg Intravenous BID     acetaminophen, PEG 3350, magnesium hydroxide, bisacodyl, Fleet Enema, ondansetron, Prochlorperazine Edisylate, diphenhydrAMINE **OR** diphenhydrAMINE, oxyCODONE HCl **OR** oxyCO

## 2021-08-20 NOTE — PHYSICAL THERAPY NOTE
PHYSICAL THERAPY HIP TREATMENT NOTE - INPATIENT    Room Number: 407/407-A       Number of Visits to Meet Established Goals: 0    Presenting Problem: s/p R REANNA - posterior approach    Problem List  Principal Problem:    Osteoarthritis of right hip  Active P Sitting: Good  Static Standing: Not tested  Dynamic Standing: Not tested  ACTIVITY TOLERANCE                         O2 WALK       AM-PAC '6-Clicks' INPATIENT SHORT FORM - BASIC MOBILITY  How much difficulty does the patient currently have. ..  -   Turning Goal #4 Patient will negotiate 7 stairs/one curb w/ assistive device and supervision   Goal #4   Current Status NT   Goal #5 Patient verbalizes and/or demonstrates all precautions and safety concerns independently   Goal #5   Current Status In progress

## 2021-08-20 NOTE — PROGRESS NOTES
[facility]    Progress Note    Denae Martínez Patient Status:  Inpatient    1945 MRN [id number]   Location [facility] 4W/SW/SE Attending Ngoc Alcazar MD   Hosp Day # 2 PCP Dora Lerner MD     Date of Admission: 100 mg, Oral, BID  PEG 3350 (MIRALAX) powder packet 17 g, 17 g, Oral, Daily PRN  magnesium hydroxide (MILK OF MAGNESIA) 400 MG/5ML suspension 30 mL, 30 mL, Oral, Daily PRN  bisacodyl (DULCOLAX) rectal suppository 10 mg, 10 mg, Rectal, Daily PRN  Fleet Enem 2 tablet, Oral, Q4H PRN  Meclizine HCl (ANTIVERT) tab 25 mg, 25 mg, Oral, TID PRN  ALPRAZolam (XANAX) tab 0.25 mg, 0.25 mg, Oral, BID PRN        HISTORY:  Past Medical History:  Past Medical History:   Diagnosis Date   • Diverticulosis of large intestine 08/02/2021    PTT 36.5 (H) 08/02/2021    INR 1.14 08/02/2021    PTP 14.4 08/02/2021         IMAGING:   No results found.          ASSESSMENT AND PLAN:   Osteoarthritis of right hip--status post right total hip arthroplasty          Follicular non-Hodgkin's

## 2021-08-20 NOTE — PLAN OF CARE
Problem: Patient Centered Care  Goal: Patient preferences are identified and integrated in the patient's plan of care  Description: Interventions:  - What would you like us to know as we care for you?  Patient lives with her  at home and he's her s INTERVENTIONS:  - Assess for changes in respiratory status  - Assess for changes in mentation and behavior  - Position to facilitate oxygenation and minimize respiratory effort  - Oxygen supplementation based on oxygen saturation or ABGs  - Provide Smoking relaxation techniques  - Monitor for opioid side effects  - Notify MD/LIP if interventions unsuccessful or patient reports new pain  - Anticipate increased pain with activity and pre-medicate as appropriate  Outcome: Progressing     Problem: SAFETY ADULT - from staff. Patient takes oral medications for pain. Patient is voiding well, had small pellets as bowel movement.   Patient will either go home with [place] [id number] or rehab depending on how she does with PT.

## 2021-08-21 PROCEDURE — 99233 SBSQ HOSP IP/OBS HIGH 50: CPT | Performed by: HOSPITALIST

## 2021-08-21 RX ORDER — HYDROCODONE BITARTRATE AND ACETAMINOPHEN 7.5; 325 MG/1; MG/1
1 TABLET ORAL EVERY 6 HOURS PRN
Qty: 45 TABLET | Refills: 0 | Status: SHIPPED | OUTPATIENT
Start: 2021-08-21 | End: 2021-08-23

## 2021-08-21 NOTE — PROGRESS NOTES
[facility]    Progress Note    Manus Balzarine Patient Status:  Inpatient    1945 MRN [id number]   Location [facility] 4W/SW/SE Attending Chandni Manjarrez MD   Hosp Day # 3 PCP Jazmin Ralph MD     Date of Admission: Nightly  docusate sodium (COLACE) cap 100 mg, 100 mg, Oral, BID  PEG 3350 (MIRALAX) powder packet 17 g, 17 g, Oral, Daily PRN  magnesium hydroxide (MILK OF MAGNESIA) 400 MG/5ML suspension 30 mL, 30 mL, Oral, Daily PRN  bisacodyl (DULCOLAX) rectal supposito Or  oxyCODONE-acetaminophen (PERCOCET) 5-325 MG per tab 2 tablet, 2 tablet, Oral, Q4H PRN  Meclizine HCl (ANTIVERT) tab 25 mg, 25 mg, Oral, TID PRN  ALPRAZolam (XANAX) tab 0.25 mg, 0.25 mg, Oral, BID PRN        HISTORY:  Past Medical History:  Past Medical 08/02/2021         IMAGING:   No results found.          ASSESSMENT AND PLAN:   Osteoarthritis of right hip--status post right total hip arthroplasty          Follicular non-Hodgkin's lymphoma ([facility] [address]       Hyperlipidemia  ------------------------------------

## 2021-08-21 NOTE — PROGRESS NOTES
[place] [facility] - [facility]    Progress Note    Adamaris Dawson Patient Status:  Inpatient    1945 MRN [id number]   Location [facility] 4W/SW/SE Attending Melanie Eugene MD   Hosp Day # 3 PCP Jaky Kothari MD       Subjective:    Lacho diphenhydrAMINE, ondansetron, oxyCODONE-acetaminophen **OR** oxyCODONE-acetaminophen, Meclizine HCl, ALPRAZolam      Assessment and Plan:      Osteoarthritis of right hip  S/P R HIP ARTHROPLASTY POD 3, PAIN CONTROL WITH TYLENOL AND OXY, NEURO VASCULAR CHEC

## 2021-08-21 NOTE — PLAN OF CARE
Aleta Story is alert and oriented. She is voiding well and purewick is in place. Her pain is managed with oral OxyIR as needed. She ambulates with the rolling walker and 1 assist. Alignment of hip is maintained with abductor pillow in place.  The bed is low and SAFETY ADULT - FALL  Goal: Free from fall injury  Description: INTERVENTIONS:  - Assess pt frequently for physical needs  - Identify cognitive and physical deficits and behaviors that affect risk of falls.   - Champion fall precautions as indicated by asse

## 2021-08-21 NOTE — PHYSICAL THERAPY NOTE
PHYSICAL THERAPY TREATMENT NOTE - INPATIENT     Room Number: 407/407-A       Presenting Problem: s/p R REANNA - posterior approach    Problem List  Principal Problem:    Osteoarthritis of right hip  Active Problems:    Follicular non-Hodgkin's lymphoma ([facility] [address] Static Sitting: Good  Dynamic Sitting: Good           Static Standing: Fair  Dynamic Standing: Fair -    ACTIVITY TOLERANCE  Pulse: 88  Heart Rate Source: Monitor  Resp: 18  BP: 126/72  BP Loca sit EOB @ level: modified independent   Goal #1   Current Status Min A   Goal #2 Patient is able to demonstrate transfers Sit to/from Stand at assistance level: modified independent     Goal #2  Current Status Min A   Goal #3 Patient is able to ambulate 30

## 2021-08-21 NOTE — CM/SW NOTE
8/21: Per Vanessa MOTA, patient stable for discharge & now requesting SNF placement. [facility] is patient's first choice. Initiated SNF referral via JORGE L Sorto.     Addendum 8/22/2021:    Spoke with patient's  & explained that [facility] do

## 2021-08-22 LAB
ANION GAP SERPL CALC-SCNC: 5 MMOL/L (ref 0–18)
BLOOD TYPE BARCODE: 6200
BUN BLD-MCNC: 12 MG/DL (ref 7–18)
BUN/CREAT SERPL: 11.3 (ref 10–20)
CALCIUM BLD-MCNC: 8.6 MG/DL (ref 8.5–10.1)
CHLORIDE SERPL-SCNC: 105 MMOL/L (ref 98–112)
CO2 SERPL-SCNC: 30 MMOL/L (ref 21–32)
CREAT BLD-MCNC: 1.06 MG/DL
DEPRECATED RDW RBC AUTO: 53.4 FL (ref 35.1–46.3)
ERYTHROCYTE [DISTWIDTH] IN BLOOD BY AUTOMATED COUNT: 17.9 % (ref 11–15)
GLUCOSE BLD-MCNC: 101 MG/DL (ref 70–99)
HCT VFR BLD AUTO: 27.4 %
HGB BLD-MCNC: 8.4 G/DL
MCH RBC QN AUTO: 25 PG (ref 26–34)
MCHC RBC AUTO-ENTMCNC: 30.7 G/DL (ref 31–37)
MCV RBC AUTO: 81.5 FL
OSMOLALITY SERPL CALC.SUM OF ELEC: 290 MOSM/KG (ref 275–295)
PLATELET # BLD AUTO: 328 10(3)UL (ref 150–450)
POTASSIUM SERPL-SCNC: 4.6 MMOL/L (ref 3.5–5.1)
RBC # BLD AUTO: 3.36 X10(6)UL
SODIUM SERPL-SCNC: 140 MMOL/L (ref 136–145)
WBC # BLD AUTO: 7.7 X10(3) UL (ref 4–11)

## 2021-08-22 PROCEDURE — 99233 SBSQ HOSP IP/OBS HIGH 50: CPT | Performed by: HOSPITALIST

## 2021-08-22 RX ORDER — FAMOTIDINE 10 MG/ML
20 INJECTION, SOLUTION INTRAVENOUS DAILY
Status: DISCONTINUED | OUTPATIENT
Start: 2021-08-23 | End: 2021-08-23

## 2021-08-22 RX ORDER — FAMOTIDINE 20 MG/1
20 TABLET ORAL DAILY
Status: DISCONTINUED | OUTPATIENT
Start: 2021-08-23 | End: 2021-08-23

## 2021-08-22 NOTE — PLAN OF CARE
Pt is alert and oriented. Reports no pain at this timeHer pain is managed with oral OxyIR as needed. She ambulates with the rolling walker and 1 assist. Alignment of hip is maintained with abductor pillow in place. The bed is low and locked.  Call light wit goal  Description: INTERVENTIONS:  - Encourage pt to monitor pain and request assistance  - Assess pain using appropriate pain scale  - Administer analgesics based on type and severity of pain and evaluate response  - Implement non-pharmacological measures

## 2021-08-22 NOTE — PHYSICAL THERAPY NOTE
PHYSICAL THERAPY HIP TREATMENT NOTE - INPATIENT    Room Number: 407/407-A       Number of Visits to Meet Established Goals: 0    Presenting Problem: s/p R REANNA - posterior approach    Problem List  Principal Problem:    Osteoarthritis of right hip  Active P Sub-acute rehabilitation    PLAN  PT Treatment Plan: Bed mobility; Body mechanics; Endurance; Patient education;Gait training;Strengthening;Transfer training;Balance training;Stair training;Stoop training    SUBJECTIVE  Pt was agreeable to therapy session. GOALS     Goals to be met by: 8/25/21  Patient Goal Patient's self-stated goal is: return home   Goal #1 Patient is able to demonstrate supine - sit EOB @ level: modified independent   Goal #1   Current Status Min A   Goal #2 Patient is able to demonstrate

## 2021-08-22 NOTE — PROGRESS NOTES
[facility] HOSP - [facility]    Progress Note    Nano Mullins Patient Status:  Inpatient    1945 MRN [id number]   Location [facility] 4W/SW/SE Attending Дмитрий Navarro MD   Hosp Day # 4 PCP Eva Rhodes MD     Date of Admission: Speech Therapy      Visit Type: Treatment  -  Swallow    Precautions     - Medical precautions:  History of multiple sclerosis with quadriplegia and bedbound.    Current Diet:     - Dentition: intact    - Feeding: does not feed self    SUBJECTIVE  Pt awake, alert, cooperative, seen in room agrees to snack as ate a late breakfast   Patient/family goals: maximize function and return to previous functional status     OBJECTIVE       Swallowing   No temperature spike noted.  Patient positioning: upright in bed  Pretrial vocal quality: weak    Consistencies:  Thin Liquid (straw):     - Amount given:single drinks only   - Oral: impaired, suspect premature spillage and impaired bolus control   - Pharyngeal: impaired, suspect delayed swallow response  General Consistency:    - Amount given: multiple bite size trials   - Oral: impaired, slow mastication and slow oral transit   - Pharyngeal: impaired, suspect delayed swallow response  Pt tolerates with no ss aspiration.  Single drinks only via straw, also able to take liquid as wash.            ASSESSMENT                                                                        • Impairments: swallowing  • Functional Limitations: eating/drinking  • Skilled therapy is required to establish safe means of nutrition, hydration and medication administration  • The patient was seen on 7th floor.  Today's treatment focused on assessment of tolerance of recommended diet.      The patient is demonstrating good progress as evidenced by tolerance of thin liquids via straw in single drinks and general solid trials with no ss aspiration/penetration.  Swallow remains delayed with premature spilling suspected and confirmed in video 3/31.  Slow mastication, but good clearance.  At this time, the patient continues to demonstrate mild and moderate impairments in swallowing which limit the performance of eating/drinking.  Patient is currently needing  mild cueing and total assist for  Once  [COMPLETED] ondansetron (ZOFRAN) injection 4 mg, 4 mg, Intravenous, Once PRN  rosuvastatin (CRESTOR) tab 10 mg, 10 mg, Oral, Nightly  [] sodium chloride 0.9% IV bolus 500 mL, 500 mL, Intravenous, Once PRN  Senna (SENOKOT) tab 17.2 mg, 17.2 mg, eating/drinking.    Requires SLP Follow Up: Yes    Recommendations for Discharge: SLP: pending progress       • Rehab Potential: fair  • Potential barriers to progress:  Complex medical history and current medical conditions  • Progress: Progressing toward goals    Patient at end of session:    - location: in chair    - safety measures: alarm system in place/re-engaged and call light within reach    - hand off to: nurse    PLAN  Frequency: 4/1 (SW1) M-F by 4/3/21; AM, JAB; see again to assess diet tolerance of general and thins, try to see with meal or large snack (did not want to eat much today) overall oral phase is slowed, once swallow triggers, pt with adequate clearance, reinforce swallow guidelines.    Interventions:  Assess tolerance of least restrictive oral diet, dysphagia therapy and patient/family education    Plan/Goal Agreement:  Patient agrees with goals and treatment plan    RECOMMENDATIONS     -Diet:          *general and thin liquids    -Feeding Guidelines:          *allow extra time to swallow, constant supervision, feed patient/total feeding assistance, set up tray, small bites/sips, sit fully upright for all po intake and sit up for 30 minutes after meal    -Speech Reviewed Swallow:         *with patient/family and with clinical caregivers  -Additional recommendations: WATCH FOR SWALLOWS, WATCH FOR SIGNS OF FATIGUE    GOALS  Review Date: 4/3/2021  Short Term Goals:     GOAL MET 3/31 1.Patient will consume Puree/Dysphagia 1, Thin Liquids diet with optimum safety and efficiency of swallow function with less than 10% signs/symptoms of aspiration.  Goal Progress: modified--see goal 2    GOAL MET 3/31 2.  Pt will demonstrate consistent swallows with oral trials in readiness for oral intake vs video swallow study.     NEW GOAL 3/31  3. Patient will consume General, Thin Liquids diet with optimum safety and efficiency of swallow function with less than 10% signs/symptoms of aspiration.    Long Term  Goals:     SWALLOW:  Patient will consume General, Thin Liquids diet with optimum safety and efficiency of swallow function with less than 10% signs/symptoms of aspiration.  Documented in the chart in the following areas: Pain. Assessment.      Therapy procedure time and total treatment time can be found documented on the Time Entry flowsheet   injection 4 mg, 4 mg, Intravenous, Q6H PRN  oxyCODONE-acetaminophen (PERCOCET) 5-325 MG per tab 1 tablet, 1 tablet, Oral, Q4H PRN   Or  oxyCODONE-acetaminophen (PERCOCET) 5-325 MG per tab 2 tablet, 2 tablet, Oral, Q4H PRN  Meclizine HCl (ANTIVERT) tab 25 m 08/02/2021    TP 7.5 08/02/2021    AST 9 (L) 08/02/2021    ALT 14 08/02/2021    PTT 36.5 (H) 08/02/2021    INR 1.14 08/02/2021    PTP 14.4 08/02/2021         IMAGING:   No results found.          ASSESSMENT AND PLAN:   Osteoarthritis of right hip--status po

## 2021-08-22 NOTE — PROGRESS NOTES
[facility]    Progress Note    Manus Dontazarine Patient Status:  Inpatient    1945 MRN [id number]   Location [facility] 4W/SW/SE Attending Chandni Manjarrez MD   Hosp Day # 4 PCP Jazmin Ralph MD       Subjective:    Lacho HYDROmorphone HCl **OR** HYDROmorphone HCl, zolpidem, Naloxone HCl, diphenhydrAMINE, ondansetron, oxyCODONE-acetaminophen **OR** oxyCODONE-acetaminophen, Meclizine HCl, ALPRAZolam      Assessment and Plan:      Osteoarthritis of right hip  S/P R HIP ARTHRO

## 2021-08-22 NOTE — PROGRESS NOTES
[facility] Pharmacy Note:  Renal Dose Adjustment    Alicia Rodas has been prescribed famotidine (PEPCID) 20 mg intravenously and orally every 12 hours. Estimated Creatinine Clearance: 43.2 mL/min (A) (based on SCr of 1.06 mg/dL (H)).     Calculated creatini

## 2021-08-22 NOTE — PLAN OF CARE
Problem: Patient Centered Care  Goal: Patient preferences are identified and integrated in the patient's plan of care  Description: Interventions:  - What would you like us to know as we care for you?  Patient lives with her  at home and he's her s needed  - Assess and instruct to report SOB or any respiratory difficulty  - Respiratory Therapy support as indicated  - Manage/alleviate anxiety  - Monitor for signs/symptoms of CO2 retention  Outcome: Progressing     Problem: [address] deficits and behaviors that affect risk of falls.   - [place] fall precautions as indicated by assessment.  - Educate pt/family on patient safety including physical limitations  - Instruct pt to call for assistance with activity based on assessment  - Mod

## 2021-08-22 NOTE — OCCUPATIONAL THERAPY NOTE
OCCUPATIONAL THERAPY TREATMENT NOTE - INPATIENT    Room Number: 407/407-A         Presenting Problem:  (R franco)     Problem List  Principal Problem:    Osteoarthritis of right hip  Active Problems:    Follicular non-Hodgkin's lymphoma ([address]    Hyperlipidemi strengthening/ROM; Endurance training;Patient/Family education;Patient/Family training;Equipment eval/education; Compensatory technique education    SUBJECTIVE  \"I'm sorry for being so crabby with you. Thank you for your help. \"     OBJECTIVE  Precautions: return home      Patient will complete LE dressing with ae/supervision  Comment: Ongoing     Patient will complete functionl transfer with supervision  Comment: min-CGA w/RW     Patient will complete self care task at sink level with supervision   Comment:

## 2021-08-23 VITALS
HEIGHT: 67 IN | SYSTOLIC BLOOD PRESSURE: 116 MMHG | DIASTOLIC BLOOD PRESSURE: 64 MMHG | WEIGHT: 141 LBS | HEART RATE: 93 BPM | OXYGEN SATURATION: 96 % | BODY MASS INDEX: 22.13 KG/M2 | RESPIRATION RATE: 16 BRPM | TEMPERATURE: 98 F

## 2021-08-23 LAB
ANION GAP SERPL CALC-SCNC: 4 MMOL/L (ref 0–18)
BUN BLD-MCNC: 14 MG/DL (ref 7–18)
BUN/CREAT SERPL: 14.1 (ref 10–20)
CALCIUM BLD-MCNC: 8.8 MG/DL (ref 8.5–10.1)
CHLORIDE SERPL-SCNC: 104 MMOL/L (ref 98–112)
CO2 SERPL-SCNC: 30 MMOL/L (ref 21–32)
CREAT BLD-MCNC: 0.99 MG/DL
DEPRECATED RDW RBC AUTO: 53.6 FL (ref 35.1–46.3)
ERYTHROCYTE [DISTWIDTH] IN BLOOD BY AUTOMATED COUNT: 18 % (ref 11–15)
GLUCOSE BLD-MCNC: 98 MG/DL (ref 70–99)
HCT VFR BLD AUTO: 28.5 %
HGB BLD-MCNC: 8.8 G/DL
MCH RBC QN AUTO: 25.3 PG (ref 26–34)
MCHC RBC AUTO-ENTMCNC: 30.9 G/DL (ref 31–37)
MCV RBC AUTO: 81.9 FL
OSMOLALITY SERPL CALC.SUM OF ELEC: 286 MOSM/KG (ref 275–295)
PLATELET # BLD AUTO: 350 10(3)UL (ref 150–450)
POTASSIUM SERPL-SCNC: 4.4 MMOL/L (ref 3.5–5.1)
RBC # BLD AUTO: 3.48 X10(6)UL
SODIUM SERPL-SCNC: 138 MMOL/L (ref 136–145)
WBC # BLD AUTO: 7.5 X10(3) UL (ref 4–11)

## 2021-08-23 PROCEDURE — 99239 HOSP IP/OBS DSCHRG MGMT >30: CPT | Performed by: HOSPITALIST

## 2021-08-23 RX ORDER — ALPRAZOLAM 0.25 MG/1
0.25 TABLET ORAL 2 TIMES DAILY PRN
Qty: 14 TABLET | Refills: 0 | Status: SHIPPED | OUTPATIENT
Start: 2021-08-23 | End: 2021-11-22 | Stop reason: ALTCHOICE

## 2021-08-23 RX ORDER — OXYCODONE HYDROCHLORIDE AND ACETAMINOPHEN 5; 325 MG/1; MG/1
1-2 TABLET ORAL EVERY 6 HOURS PRN
Qty: 28 TABLET | Refills: 0 | Status: SHIPPED | OUTPATIENT
Start: 2021-08-23 | End: 2021-11-22 | Stop reason: ALTCHOICE

## 2021-08-23 NOTE — PHYSICAL THERAPY NOTE
PHYSICAL THERAPY HIP TREATMENT NOTE - INPATIENT    Room Number: 407/407-A       Number of Visits to Meet Established Goals: 0    Presenting Problem: s/p R REANNA - posterior approach    Problem List  Principal Problem:    Osteoarthritis of right hip  Active P Treatment Plan: Bed mobility; Body mechanics; Endurance; Patient education;Gait training;Strengthening;Stoop training;Stair training;Transfer training;Balance training    SUBJECTIVE  Pt was agreeable to therapy session.      OBJECTIVE  Precautions: REANNA - poste is: return home   Goal #1 Patient is able to demonstrate supine - sit EOB @ level: modified independent   Goal #1   Current Status Min A   Goal #2 Patient is able to demonstrate transfers Sit to/from Stand at assistance level: modified independent     Goal

## 2021-08-23 NOTE — PLAN OF CARE
Problem: Patient Centered Care  Goal: Patient preferences are identified and integrated in the patient's plan of care  Description: Interventions:  - What would you like us to know as we care for you?  Patient lives with her  at home and he's her s INTERVENTIONS:  - Assess for changes in respiratory status  - Assess for changes in mentation and behavior  - Position to facilitate oxygenation and minimize respiratory effort  - Oxygen supplementation based on oxygen saturation or ABGs  - Provide Smoking relaxation techniques  - Monitor for opioid side effects  - Notify MD/LIP if interventions unsuccessful or patient reports new pain  - Anticipate increased pain with activity and pre-medicate as appropriate  Outcome: Progressing     Problem: SAFETY ADULT - assist.  Patient is voiding well, uses a purewick catheter when in bed at night. Patient is awaiting for insurance approval and placement  for a HUGH.  at bedside.

## 2021-08-23 NOTE — CM/SW NOTE
MARIANA followed up on DC planning. 09:40AM  MARIANA called over to [facility]  asking for status if pt is accepted and if a bed is available.  No answer - left VM     11:45AM  MARIANA called over to [facility] again, no answer left      1:30PM  MARIANA

## 2021-08-23 NOTE — PROGRESS NOTES
[facility]    Progress Note    Stephanie Faulkner Patient Status:  Inpatient    1945 MRN [id number]   Location [facility] 4W/SW/SE Attending David Garcia MD   Hosp Day # 5 PCP Cheryl Ferrell MD     Date of Admission: mg, 4 mg, Intravenous, Once PRN  rosuvastatin (CRESTOR) tab 10 mg, 10 mg, Oral, Nightly  [] sodium chloride 0.9% IV bolus 500 mL, 500 mL, Intravenous, Once PRN  Senna (SENOKOT) tab 17.2 mg, 17.2 mg, Oral, Nightly  docusate sodium (COLACE) cap 100 mg PRN  oxyCODONE-acetaminophen (PERCOCET) 5-325 MG per tab 1 tablet, 1 tablet, Oral, Q4H PRN   Or  oxyCODONE-acetaminophen (PERCOCET) 5-325 MG per tab 2 tablet, 2 tablet, Oral, Q4H PRN  Meclizine HCl (ANTIVERT) tab 25 mg, 25 mg, Oral, TID PRN  ALPRAZolam (XA 08/02/2021    AST 9 (L) 08/02/2021    ALT 14 08/02/2021    PTT 36.5 (H) 08/02/2021    INR 1.14 08/02/2021    PTP 14.4 08/02/2021         IMAGING:   No results found.          ASSESSMENT AND PLAN:   Osteoarthritis of right hip--status post right total hip ar

## 2021-08-23 NOTE — PLAN OF CARE
Problem: Patient Centered Care  Goal: Patient preferences are identified and integrated in the patient's plan of care  Description: Interventions:  - What would you like us to know as we care for you?  Patient lives with her  at home and he's her s anticoagulation.  - Prevent fall.  - PT/OT as ordered.   - See additional Care Plan goals for specific interventions  8/23/2021 1445 by Ramiro Wells, RN  Outcome: Adequate for Discharge  8/23/2021 1226 by Ramiro Wells, RN  Outcome: Progressing Implement strategies to promote bladder emptying  8/23/2021 1445 by Ny Beltrán RN  Outcome: Adequate for Discharge  8/23/2021 1226 by Ny Beltrán RN  Outcome: Progressing     Problem: PAIN - ADULT  Goal: Verbalizes/displays adequate comf Identify barriers to discharge w/pt and caregiver  - Include patient/family/discharge partner in discharge planning  - Arrange for needed discharge resources and transportation as appropriate  - Identify discharge learning needs (meds, wound care, etc)  -

## 2021-08-23 NOTE — DISCHARGE SUMMARY
[facility]    Discharge Summary    Rashid Johnson Patient Status:  Inpatient    1945 MRN [id number]   Location [facility] 4W/SW/SE Attending Christie Lowery MD   Hosp Day # 5 PCP Porfirio Aviles MD     Date of Admissio disorder  Xanax prn     Complications: none      Surgical Procedures     Case IDs Date Procedure Surgeon Location Status    [id number] 8/18/21 right total hip arthroplasty Sandra Fischer  [facility] MAIN OR Comp            Discharge Plan:   Discharge Condition rosuvastatin 10 MG Tabs  Commonly known as: CRESTOR      rosuvastatin 10 mg tablet   TAKE ONE TABLET BY MOUTH ONE TIME DAILY   Refills: 0     Vitamin D3 (Cholecalciferol) 50 MCG (2000 UT) Tabs      Vitamin D3 50 mcg (2,000 unit) tablet   Take 1 tablet ev constipation. Risk for Infection: Monitor your incisional site daily for signs and symptoms of infection. These include increased redness/tenderness at the incisional site, purulent odorous drainage, fever of 100.5 or greater.  If you have a fever use y

## 2021-08-23 NOTE — PLAN OF CARE
Pt alert. Pain is controlled on current meds. Pt urinating freely per purewick. No cough or sob noted. Using incentive spirometer regularly. Call light within easy reach.  at bedside. VSS. No acute distress noted.     Problem: RESPIRATORY - AD for opioid side effects  - Notify MD/LIP if interventions unsuccessful or patient reports new pain  - Anticipate increased pain with activity and pre-medicate as appropriate  Outcome: Progressing     Problem: SAFETY ADULT - FALL  Goal: Free from fall injur

## 2021-09-07 PROBLEM — Z96.641 HISTORY OF TOTAL HIP ARTHROPLASTY, RIGHT: Status: ACTIVE | Noted: 2021-09-07

## 2021-09-09 PROBLEM — Q61.02 MULTIPLE RENAL CYSTS: Status: ACTIVE | Noted: 2021-08-05

## 2021-09-09 PROBLEM — N76.2 VULVITIS: Status: ACTIVE | Noted: 2021-08-12

## 2021-09-28 PROBLEM — D50.9 IRON DEFICIENCY ANEMIA: Status: ACTIVE | Noted: 2021-09-10

## 2021-09-28 PROBLEM — F41.1 GENERALIZED ANXIETY DISORDER: Status: ACTIVE | Noted: 2021-09-10

## 2021-11-04 PROBLEM — M79.605 LEFT LEG PAIN: Status: ACTIVE | Noted: 2021-11-04

## 2021-11-11 PROBLEM — F41.8 MIXED ANXIETY AND DEPRESSIVE DISORDER: Status: ACTIVE | Noted: 2021-10-07

## 2021-11-18 ENCOUNTER — HOSPITAL ENCOUNTER (OUTPATIENT)
Dept: NUCLEAR MEDICINE | Facility: HOSPITAL | Age: 76
Discharge: HOME | End: 2021-11-18
Attending: ORTHOPAEDIC SURGERY
Payer: MEDICARE

## 2021-11-18 DIAGNOSIS — C82.90 FOLLICULAR NON-HODGKIN'S LYMPHOMA (HCC): ICD-10-CM

## 2021-11-18 DIAGNOSIS — M79.605 LEFT LEG PAIN: ICD-10-CM

## 2021-11-18 PROCEDURE — 78315 BONE IMAGING 3 PHASE: CPT | Performed by: ORTHOPAEDIC SURGERY

## 2021-12-21 PROBLEM — R50.9 FEVER: Status: ACTIVE | Noted: 2021-12-13

## 2022-03-17 ENCOUNTER — OFFICE VISIT (OUTPATIENT)
Dept: PHYSICAL MEDICINE AND REHAB | Facility: CLINIC | Age: 77
End: 2022-03-17
Payer: MEDICARE

## 2022-03-17 ENCOUNTER — TELEPHONE (OUTPATIENT)
Dept: PHYSICAL MEDICINE AND REHAB | Facility: CLINIC | Age: 77
End: 2022-03-17

## 2022-03-17 VITALS
HEART RATE: 93 BPM | OXYGEN SATURATION: 96 % | WEIGHT: 145 LBS | BODY MASS INDEX: 22.76 KG/M2 | HEIGHT: 67 IN | DIASTOLIC BLOOD PRESSURE: 60 MMHG | SYSTOLIC BLOOD PRESSURE: 120 MMHG

## 2022-03-17 DIAGNOSIS — M48.062 LUMBAR STENOSIS WITH NEUROGENIC CLAUDICATION: Primary | ICD-10-CM

## 2022-03-17 PROCEDURE — 99204 OFFICE O/P NEW MOD 45 MIN: CPT | Performed by: PHYSICAL MEDICINE & REHABILITATION

## 2022-03-17 NOTE — H&P
History and Physical    C/C: back, left leg pain    HPI: 66-year-old female with history of right total hip arthroplasty, lymphoma presents with low back, left anterior thigh, knee, lateral shin pain with numbness and tingling. Symptoms seem to be most centered over the left anterior thigh, worsen when she initially begins to walk. She is able to walk for about 2-3 blocks before needing to sit. She does have intermittent numbness and tingling below the knee. She has had an MRI of the lumbar spine. She is previously under care of the orthopedic surgeon Dr. Katarina Franco; her left hip was evaluated several months ago and while she was told she has some arthritic changes the hip was not overly bothersome at the time. She has had an MRI of the lumbar spine at an outside institution; report available but not the images. She was referred to pain management but her daughter in law suggested she come see me. Allergies: cephalexin, TMP-SMX, lexapro    Patient-reported ROS  Constitutional  Sleep Disturbance: admits - pain  Cardiovascular  Chest Pain: denies  Irregular Heartbeat: denies   Gastrointestinal  Bowel Incontinence: denies  Heartburn: denies   Genitourinary  Difficulty Urinating: denies  Bladder Incontinence: denies   Musculoskeletal  Joint Stiffness: admits  Painful Joints: admits  Neurological  Loss of Strength Since last Visit: admits  Tingling/Numbness: admits     Physical exam:  BMI 22.71. Blood pressure 120/60. Pulse 93. O2 sat 96%    Lumbar spine exam:    No pain with lumbar flexion. + pain with lumbar extension. Mild tenderness to palpation lumbar paraspinals. No ttp PSIS. 5/5 LE strength b/l  SILT b/l LE  1/4 quad, gastrocs reflexes b/l  Seated slump test negative  SLR negative bilaterally  Stinchfield test + left  Left hip flexion limited to approximately 70 degrees  Pain with internal rotation of the left hip    Imaging: No imaging to review.  She brings in an MRI report dated 11/4/2021 which reports decreased T1 signal of the osseous structures that could be related to an increase in hematopoiesis. Infiltrative process related to patient's known lymphoma is not excluded. Abnormal decreased signal of the liver and spleen of uncertain significance. Scoliosis with concavity towards the right. Severe central stenosis at L4-L5. Moderate stenosis at L2-L3, L3-L4, and L5-S1. No available imaging of the left hip    Assessment and plan:  1) left hip pain with decreased ROM, probable osteoarthritis  2) lumbar stenosis  3) lumbar radiculitis, L5 distribution    She seems to have symptoms that may be arising both from left hip osteoarthritis and lumbar stenosis. Recommend left L5 TFESI under fluoroscopy, local. Discussed risks of procedure, including bleeding, infection, nerve injury, HA. She will have XR of the left hip sent over. Consider repeating imaging of the left hip, left hip joint steroid injection under US guidance in the future. Follow up for injection.     Renny Grigsby DO  Physical Medicine and [facility]

## 2022-03-17 NOTE — TELEPHONE ENCOUNTER
Patient has been scheduled for Left L5 transforaminal epidural steroid injection on 3/21/21 at the [address] with . -Anesthesia type: Local.  -If receiving MAC or IVC sedation patient will need to get COVID tested 3 days prior even if already vaccinated (order placed by [address]  -If scheduling [facility] and [facility] covid testing required for all procedures whether patient is vaccinated or not. -Patient informed not to eat or drink anything after midnight the night prior to the procedure, if being sedated. -Patient was advised that if he/she does receive the covid vaccine it needs to be at least 2 weeks before or after the injection. -Medications and allergies reviewed. -Patient reminded to hold NSAIDs (Ibuprofen, ASA 81, Aleve, Naproxen, Mobic etc.) for 3 days prior to [place]  if BMI is greater than 35. For Cervical injections only hold multivitamins, Vitamin E, Fish Oil, Phentermine (Lomaira) for 7 days prior to injection and NSAIDS.  mg to be held for 7 days prior to injections.  -If patient is receiving MAC/IVCS Phentermine Jackqueline Atalissa) will need to be held for 7 days prior to injection.  -If on blood thinner clearance has been received to hold this medication by provider.   -Patient informed he/she will need a  to and from procedure. -[facility] is located in the [facility] 1st floor. Patient may park in the yellow parking. Patient verbalized understanding and agrees with plan.  -----> Scheduled in Epic: Yes  -----> Scheduled in [employer]:  Yes

## 2022-03-17 NOTE — TELEPHONE ENCOUNTER
Per Medicare Guidelines -no authorization is required for randolph     Status: Approved-Covered Benefit     Clinical staff can proceed with scheduling Left L5 transforaminal epidural steroid injection CPT 48888+83319

## 2022-03-21 ENCOUNTER — OFFICE VISIT (OUTPATIENT)
Dept: SURGERY | Facility: CLINIC | Age: 77
End: 2022-03-21

## 2022-03-21 ENCOUNTER — TELEPHONE (OUTPATIENT)
Dept: PHYSICAL MEDICINE AND REHAB | Facility: CLINIC | Age: 77
End: 2022-03-21

## 2022-03-21 DIAGNOSIS — M54.16 LEFT LUMBAR RADICULITIS: Primary | ICD-10-CM

## 2022-03-21 PROCEDURE — 64483 NJX AA&/STRD TFRM EPI L/S 1: CPT | Performed by: PHYSICAL MEDICINE & REHABILITATION

## 2022-03-21 NOTE — TELEPHONE ENCOUNTER
Unable to upload CD of XR of left hip. CD mailed back to patients address on file.
pt netta drop off pt MRI disck. Place disk in dr/folder
Breathing spontaneous and unlabored. Breath sounds clear and equal bilaterally with regular rhythm.

## 2022-03-21 NOTE — PROCEDURES
[address]    LUMBAR TRANSFORAMINAL   NAME:  Digna Blake    MR #:    [id number] :  1945     PHYSICIAN:  Gisel Villarreal DO        Operative Report    DATE OF PROCEDURE: 3/21/2022   PREOPERATIVE DIAGNOSES: 1. Left lumbar radiculitis        POSTOPERATIVE DIAGNOSES:   1. Left lumbar radiculitis        PROCEDURES: left L5 transforaminal epidural steroid injection done under fluoroscopic guidance with contrast enhancement. SURGEON: Gisel Villarreal DO   ANESTHESIA: Local   INDICATIONS:      OPERATIVE PROCEDURE:  Written consent was obtained from the patient. The patient was brought into the operating room and placed in the prone position on the fluoroscopy table with pillow underneath her abdomen. The patient's skin was cleaned and draped in a normal sterile fashion. Using AP fluoroscopy, all five lumbar vertebrae were identified. When the fifth vertebra was identified, fluoroscopy was right anterior obliqued opening up the left L5-S1 intervertebral foramen. At this point in time, the patient's skin was anesthetized with 1% PF lidocaine without epinephrine. Then, a 5 inch, 22-gauge spinal needle was inserted and directed towards the left L5-S1 intervertebral foramen. When it felt to be in good position, AP fluoroscopy was used to advance the needle to the 6 o'clock position on the left L5 pedicle. At this point in time, Omnipaque-240 contrast was used to obtain a good epidurogram indicating correct needle placement. Then, aspiration was performed. No blood, fluid, or air was aspirated. Then, the patient was injected with a 4 cc solution of 2 cc of 6 mg/cc of celestone and 2 cc of 1% PF lidocaine without epinephrine. After this, the needle was removed. The patient's skin was cleaned. A Band-Aid was applied. The patient was transferred to the cart and into [facility]. The patient was given discharge instructions and will follow up in the clinic as scheduled.   Throughout the whole procedure, the patient's pulse oximetry and vital signs were monitored and they remained completely stable. Also, throughout the whole procedure, prior to injection of any medication, aspiration was performed. No blood, fluid, or air was aspirated at anytime.     Mayank Judd DO  Physical Medicine and [facility]

## 2022-04-19 PROBLEM — U07.1 COVID-19: Status: ACTIVE | Noted: 2022-01-06

## 2022-04-19 PROBLEM — R63.0 DECREASE IN APPETITE: Status: ACTIVE | Noted: 2022-01-21

## 2022-04-21 ENCOUNTER — OFFICE VISIT (OUTPATIENT)
Dept: PHYSICAL MEDICINE AND REHAB | Facility: CLINIC | Age: 77
End: 2022-04-21
Payer: MEDICARE

## 2022-04-21 ENCOUNTER — TELEPHONE (OUTPATIENT)
Dept: PHYSICAL MEDICINE AND REHAB | Facility: CLINIC | Age: 77
End: 2022-04-21

## 2022-04-21 VITALS
SYSTOLIC BLOOD PRESSURE: 100 MMHG | HEIGHT: 67 IN | DIASTOLIC BLOOD PRESSURE: 62 MMHG | BODY MASS INDEX: 22.76 KG/M2 | WEIGHT: 145 LBS

## 2022-04-21 DIAGNOSIS — M16.12 PRIMARY OSTEOARTHRITIS OF LEFT HIP: Primary | ICD-10-CM

## 2022-04-21 PROCEDURE — 99214 OFFICE O/P EST MOD 30 MIN: CPT | Performed by: PHYSICAL MEDICINE & REHABILITATION

## 2022-04-21 RX ORDER — ATOVAQUONE AND PROGUANIL HYDROCHLORIDE 250; 100 MG/1; MG/1
TABLET, FILM COATED ORAL
COMMUNITY

## 2022-04-21 RX ORDER — HYDROXYZINE HYDROCHLORIDE 10 MG/1
TABLET, FILM COATED ORAL
COMMUNITY

## 2022-04-21 NOTE — PATIENT INSTRUCTIONS
Recommend you discuss possibly switching the remeron to Cymbalta/Duloxetine; may help with symptoms of anxiety, depression, joint pain from osteoarthritis, and nerve pain in the left leg. abnormal lab result

## 2022-04-21 NOTE — TELEPHONE ENCOUNTER
Per Medicare guidelines-no authorization required for Lidocaine/Kenalog injections in office     Left hip joint steroid injection under US guidance CPT 87716+    Status: Approved-Covered Benefit    Routing to clinical staff to schedule in late May/early June per Dr. Grayson Wiggins

## 2022-04-21 NOTE — PROGRESS NOTES
Progress note    C/C: left leg pain    HPI: 70-year-old female presents for follow-up. She reports 50% relief of leg pain. She continues to have burning pain along the medial aspect of the left thigh, lateral thigh pain, lateral shin pain without associated numbness or tingling. She has been able to walk in a better fashion since injection has been done, but does develop left anterior knee pain with walking. She was seen by her orthopedic surgeon earlier this week. X-rays were taken, she was told that arthritis in the left hip has progressed, but that she does not require hip replacement at this time. She was also told that her symptoms may be coming from her lumbar spine. Pertinent allergies: cephalexin, TMP-SMX, escitalopram    Physical exam:  BMI 22.71  /62    Decreased left hip flexion during swing phase of gait; she hyperextends her left knee  2/4 quad, 1/4 gastrocs reflexes bilaterally  Limited passive left hip flexion  Reproducible thigh pain with flexion, internal rotation of the left hip    Imaging: Outside imaging from 4/19/22 independently reviewed; she has severe, near bone-on-bone joint space narrowing    Assessment and plan  1. Primary osteoarthritis of left hip  2. Lumbar stenosis, left lumbar radiculitis  3. Depression, anxiety    We discussed treatment options; given the primary symptoms in the thigh reproduced on flexion, internal rotation of the left hip recommend left hip joint steroid injection under US guidance. She and her  are aware that should she need surgical treatment for her hip no surgical treatment would be able to be rendered until 3 months after hip joint steroid injection was done.  I discussed with them that the role for repeat left L5 transforaminal epidural would realistically be to decrease radicular symptoms distal to the knee, but as it seems the thigh symptoms are referred pain from the hip I would not expect a repeat steroid injection to help much with the thigh symptoms. We will plan to schedule a left hip joint steroid injection under US guidance at least 2 weeks prior to a planned vacation. The left knee pain is probably a consequence of her abnormal gait due to decreased hip flexion, as she tends to slightly hyperextend the left knee during the swing phase when she walks. We also discussed that for neuropathic and osteoarthritic joint pain duloxetine can be used to treat those symptoms, and is also used to treat both anxiety and depression. She will discuss with her psychiatrist if it would be reasonable to have her transition from mirtazepine to duloxetine. Would start with 30mg daily.      Mary Amador DO  Physical Medicine and [address]

## 2022-05-31 ENCOUNTER — OFFICE VISIT (OUTPATIENT)
Dept: PHYSICAL MEDICINE AND REHAB | Facility: CLINIC | Age: 77
End: 2022-05-31
Payer: MEDICARE

## 2022-05-31 DIAGNOSIS — M16.12 PRIMARY OSTEOARTHRITIS OF LEFT HIP: Primary | ICD-10-CM

## 2022-05-31 PROCEDURE — 20611 DRAIN/INJ JOINT/BURSA W/US: CPT | Performed by: PHYSICAL MEDICINE & REHABILITATION

## 2022-05-31 RX ORDER — LIDOCAINE HYDROCHLORIDE 10 MG/ML
6 INJECTION, SOLUTION INFILTRATION; PERINEURAL ONCE
Status: COMPLETED | OUTPATIENT
Start: 2022-05-31 | End: 2022-05-31

## 2022-05-31 RX ORDER — TRIAMCINOLONE ACETONIDE 40 MG/ML
40 INJECTION, SUSPENSION INTRA-ARTICULAR; INTRAMUSCULAR ONCE
Status: COMPLETED | OUTPATIENT
Start: 2022-05-31 | End: 2022-05-31

## 2022-05-31 NOTE — PROCEDURES
Dx: primary osteoarthritis of left hip  Procedure: left hip joint steroid injection under US guidance    The patient is here for a left hip injection done under ultrasound guidance. Under ultrasound guidance the left femoral-acetabular joint was identified and a omayra was placed on the patient's skin. The skin was cleaned with betadyne swabs x 3 and anesthetized with 1% lidocaine without epinephrine. Then a 22 gauge needle was inserted under ultrasound guidance into the left femoral-acetabular joint. Aspiration was performed and no blood, fluid, or air was aspirated. The patient was then injected with 5 ml of 1 ml of 40 mg of Kenalog and 4 ml of 1% lidocaine without epinephrine. The needle was removed and a band aid was applied along with triple antibiotic ointment. The patient will follow up in 1 week with a phone call. These images are permanently stored in the ultrasound unit or PACS system.     Merlene Jeff DO  Physical Medicine and [address] [place] [address]

## 2022-06-07 ENCOUNTER — TELEPHONE (OUTPATIENT)
Dept: PHYSICAL MEDICINE AND REHAB | Facility: CLINIC | Age: 77
End: 2022-06-07

## 2022-06-07 DIAGNOSIS — M48.062 LUMBAR STENOSIS WITH NEUROGENIC CLAUDICATION: ICD-10-CM

## 2022-06-07 DIAGNOSIS — M16.12 PRIMARY OSTEOARTHRITIS OF LEFT HIP: Primary | ICD-10-CM

## 2022-06-07 NOTE — TELEPHONE ENCOUNTER
Still stiff post injection and difficulty getting up from sitting position - have not taken any medication for pain.  Her main concern is how to handle the stiffness and mobility

## 2022-06-09 RX ORDER — METHYLPREDNISOLONE 4 MG/1
TABLET ORAL
Qty: 21 TABLET | Refills: 0 | Status: SHIPPED | OUTPATIENT
Start: 2022-06-09

## 2022-06-09 NOTE — TELEPHONE ENCOUNTER
S/w pt to share the previous message. Pt will be leaving out of the country on 6[place]15[place]. Would like to know if she can take Medrol dose pack while taking Mirtazapine and Atovaquone-Proguanil (will be taking this on 6/14 and 6/15). Pt will f/u with Dr. Ramone Tavarez and Dr. Rogers Letters upon her return. Addendum at [phone]: Per verbal conversation with Dr. Grace Skinner earlier, okay for pt to continue taking Medrol pack with above medications. Pt should  med today and take 3 tablets now and 3 tablets at bedtime. If patient cannot pickup early today, she is to follow package instructions. Pt will take med as prescribed. Pt verbalized understanding.

## 2022-06-09 NOTE — TELEPHONE ENCOUNTER
Recommend she follow up with Dr. Jenny Greenberg in the future. If she is leaving out of town recommend she start a medrol dosepak; ideally she would want to start this 6 days before she leaves.

## 2022-07-18 ENCOUNTER — TELEPHONE (OUTPATIENT)
Dept: PHYSICAL MEDICINE AND REHAB | Facility: CLINIC | Age: 77
End: 2022-07-18

## 2022-07-18 DIAGNOSIS — M48.062 LUMBAR STENOSIS WITH NEUROGENIC CLAUDICATION: Primary | ICD-10-CM

## 2022-07-18 NOTE — TELEPHONE ENCOUNTER
Patient is calling in stating she would like to know if [facility]'s referral for an injection was received. Patient is unsure if she has had injection before. Patient would like to know 's opinion of the injection.

## 2022-07-18 NOTE — TELEPHONE ENCOUNTER
No referral has been sent to our location. Advised patient, patient will have them send an order for an injection from Dr. Alma Curry to Dr. Hallie Alvarez.

## 2022-07-19 NOTE — TELEPHONE ENCOUNTER
Spoke with Dr. Violet Fuentes Condition update for L hip injection: states 60% improvement presently, injection is wearing off. C/o L lateral hip pain that radiates to L lateral ankle with weakness and numbness in the knee to ankle.       States that Dr. Rk Lovelace would like to move fwd with Left L4 Transforaminal epidural steroid injection, local.    Order pended

## 2022-07-19 NOTE — TELEPHONE ENCOUNTER
Patient is calling in [place] [facility] [place] at 3 7/18, would like to know if it was received

## 2022-07-21 ENCOUNTER — TELEPHONE (OUTPATIENT)
Dept: NEUROLOGY | Facility: CLINIC | Age: 77
End: 2022-07-21

## 2022-07-21 NOTE — TELEPHONE ENCOUNTER
Left L4 Transforaminal epidural steroid injection under fluoroscopy,  CPT CODE: 22020,83388    Status: Approved- No pre-certification required. Per Medicare Guidelines; request is a covered benefit and pre-certification is not require. All Medicare coverage is based on Medical Necessity.    Notified nursing for scheduling

## 2022-07-21 NOTE — TELEPHONE ENCOUNTER
LMTCB 1st attempt    Procedure: Left L4 Transforaminal epidural steroid injection under fluoroscopy, local  Anesthesia: Local  Dx: G52.055   Location: [facility]  CPT Codes: 47101, 61397

## 2022-07-22 NOTE — TELEPHONE ENCOUNTER
Patient has been scheduled for Left L4 Transforaminal epidural steroid injection under fluoroscopy, local on 8/15/22 at the [address]  with [facility]. -Anesthesia type: Local.  -If receiving MAC or IVC sedation patient will need to get COVID tested 3 days prior even if already vaccinated (order placed by [address]  -If scheduling [facility] covid testing required for all procedures whether patient is vaccinated or not. -Patient informed not to eat or drink anything after midnight the night prior to the procedure, if being sedated. -Patient was advised that if he/she does receive the covid vaccine it needs to be at least 2 weeks before or after the injection. -Medications and allergies reviewed. -Patient reminded to hold NSAIDs (Ibuprofen, ASA 81, Aleve, Naproxen, Mobic etc.) for 3 days prior to [facility]  if BMI is greater than 35. For Cervical injections only hold multivitamins, Vitamin E, Fish Oil, Phentermine (Lomaira) for 7 days prior to injection and NSAIDS.  mg to be held for 7 days prior to injections.  -If patient is receiving MAC/IVCS Phentermine Jesus [employer]) will need to be held for 7 days prior to injection.  -If on blood thinner clearance has been received to hold this medication by provider.   -Patient informed he/she will need a  to and from procedure. -[facility] is located in the [place] [facility] 1st floor. Patient may park in the yellow parking. Patient verbalized understanding and agrees with plan.  -----> Scheduled in Epic: Yes  -----> Scheduled in Caset[employer]:  Yes

## 2022-08-15 ENCOUNTER — OFFICE VISIT (OUTPATIENT)
Dept: SURGERY | Facility: CLINIC | Age: 77
End: 2022-08-15

## 2022-08-15 DIAGNOSIS — M54.16 LEFT LUMBAR RADICULITIS: Primary | ICD-10-CM

## 2022-08-15 NOTE — PROGRESS NOTES
Patient seen 15 minutes after procedure was done. She was ambulating with assistance, and reported 90% relief of pain in the left leg following the procedure. I encouraged her to let Dr. Serjio Salomon know she felt 90% better 15 minutes after the procedure when he sees her for follow up, as this injection was partly for diagnostic purposes.     Vonna Areas DO  Physical Medicine and [facility] [address] [place] [address]

## 2022-08-15 NOTE — PROCEDURES
[address]    LUMBAR TRANSFORAMINAL   NAME:  Renan Roman    MR #:    [id number] :  1945     PHYSICIAN:  Junie Cancino DO        Operative Report    DATE OF PROCEDURE: 8/15/2022   PREOPERATIVE DIAGNOSES: 1. Left lumbar radiculitis        POSTOPERATIVE DIAGNOSES:   1. Left lumbar radiculitis        PROCEDURES: left L4 transforaminal epidural steroid injection done under fluoroscopic guidance with contrast enhancement. SURGEON: Junie Cancino DO   ANESTHESIA: Local   INDICATIONS: Left radicular leg pain     OPERATIVE PROCEDURE:  Written consent was obtained from the patient. The patient was brought into the operating room and placed in the prone position on the fluoroscopy table with pillow underneath her abdomen. The patient's skin was cleaned and draped in a normal sterile fashion. Using AP fluoroscopy, all five lumbar vertebrae were identified. When the fourth vertebra was identified, fluoroscopy was right anterior obliqued opening up the left L5-S1 intervertebral foramen. At this point in time, the patient's skin was anesthetized with 0.5% PF lidocaine without epinephrine. Then, a 3.5 inch, 22-gauge spinal needle was inserted and directed towards the left L4-5 intervertebral foramen. When it felt to be in good position, AP fluoroscopy was used to advance the needle to the 6 o'clock position on the left L4 pedicle. At this point in time, Omnipaque-240 contrast was used to obtain a good epidurogram indicating correct needle placement. Then, aspiration was performed. No blood, fluid, or air was aspirated. Then, the patient was injected with a 4 cc solution of 2 cc of 40 mg/cc of Kenalog and 2 cc of 1% PF lidocaine without epinephrine. After this, the needle was removed. The patient's skin was cleaned. A Band-Aid was applied. The patient was transferred to the cart and into [facility].   The patient was given discharge instructions and will follow up in the clinic as scheduled. Throughout the whole procedure, the patient's pulse oximetry and vital signs were monitored and they remained completely stable. Also, throughout the whole procedure, prior to injection of any medication, aspiration was performed. No blood, fluid, or air was aspirated at anytime.

## 2022-08-22 PROBLEM — M79.651 RIGHT THIGH PAIN: Status: ACTIVE | Noted: 2021-02-08

## 2022-08-22 PROBLEM — M79.18 RIGHT BUTTOCK PAIN: Status: ACTIVE | Noted: 2022-08-22

## 2022-08-22 PROBLEM — M19.049 DEGENERATIVE JOINT DISEASE OF HAND: Status: ACTIVE | Noted: 2022-08-18

## 2022-08-22 PROBLEM — N18.2 STAGE 2 CHRONIC KIDNEY DISEASE: Status: ACTIVE | Noted: 2022-08-18

## 2022-08-29 ENCOUNTER — HOSPITAL ENCOUNTER (OUTPATIENT)
Dept: CT IMAGING | Facility: HOSPITAL | Age: 77
Discharge: HOME | End: 2022-08-29
Attending: ORTHOPAEDIC SURGERY
Payer: MEDICARE

## 2022-08-29 DIAGNOSIS — M79.651 RIGHT THIGH PAIN: ICD-10-CM

## 2022-08-29 DIAGNOSIS — M79.18 RIGHT BUTTOCK PAIN: ICD-10-CM

## 2022-08-29 PROCEDURE — 73700 CT LOWER EXTREMITY W/O DYE: CPT | Performed by: ORTHOPAEDIC SURGERY

## 2023-12-08 ENCOUNTER — HOSPITAL ENCOUNTER (OUTPATIENT)
Dept: MRI IMAGING | Facility: HOSPITAL | Age: 78
Discharge: HOME | End: 2023-12-08
Attending: ORTHOPAEDIC SURGERY
Payer: MEDICARE

## 2023-12-08 DIAGNOSIS — M54.16 LUMBAR RADICULOPATHY: ICD-10-CM

## 2023-12-08 DIAGNOSIS — M43.10 SPONDYLOLISTHESIS, ACQUIRED: ICD-10-CM

## 2023-12-08 PROCEDURE — 72148 MRI LUMBAR SPINE W/O DYE: CPT | Performed by: ORTHOPAEDIC SURGERY

## 2024-01-30 ENCOUNTER — OFFICE VISIT (OUTPATIENT)
Dept: PHYSICAL MEDICINE AND REHAB | Facility: CLINIC | Age: 79
End: 2024-01-30
Payer: MEDICARE

## 2024-01-30 ENCOUNTER — TELEPHONE (OUTPATIENT)
Dept: PHYSICAL MEDICINE AND REHAB | Facility: CLINIC | Age: 79
End: 2024-01-30

## 2024-01-30 VITALS — HEART RATE: 66 BPM | WEIGHT: 155 LBS | BODY MASS INDEX: 24 KG/M2 | OXYGEN SATURATION: 97 %

## 2024-01-30 DIAGNOSIS — M16.12 PRIMARY OSTEOARTHRITIS OF LEFT HIP: ICD-10-CM

## 2024-01-30 DIAGNOSIS — M54.16 LUMBAR RADICULOPATHY: Primary | ICD-10-CM

## 2024-01-30 PROCEDURE — 99214 OFFICE O/P EST MOD 30 MIN: CPT | Performed by: PHYSICAL MEDICINE & REHABILITATION

## 2024-01-30 NOTE — TELEPHONE ENCOUNTER
Per CMS Guidelines -no authorization is required for Left L4 transforaminal WILMAR CPT 75359     Status: Authorization is not required based on medical necessity however may be subject to review once claim is submitted-Covered Benefit

## 2024-01-30 NOTE — PROGRESS NOTES
[facility]  Progress Note    CHIEF COMPLAINT:    Chief Complaint   Patient presents with    New Patient     New R handed patient is here for low back pain. States her pain is mainly in LLE.  Denies injury. She's had pain for a few years. She had a R hip replacement 2 and a half years ago. PT in the past with minimal improvement. She has trouble standing after sitting. Denies t/n or burning. Tylenol prn. 12/08/23 MRI L spine. Celebrex daily. Pain 5/10.        History of Present Illness:  Marlene H Weiler is a 78 year old female who is being seen in consultation at the request of Dr. Carlos Kaur.  She has a chief complaint of left leg pain chronically.  In 2022 she presented to our office and saw Dr. Mcmanus for similar symptoms.  Dr. Mcmanus gave her a left hip injection which resulted in 60% improvement.  She also had a left L4 transforaminal epidural injection on August 15, 2022 resulting in 90% pain relief 15 minutes after the procedure.  She followed with Dr. Seymour who diagnosed her with moderate left hip arthritis but does not feel that she there is enough arthritis to warrant hip replacement surgery.  Her symptoms are of left leg pain causing her to limp with ambulation.  After she gets for a while, she has a hard time standing up.  No numbness or tingling.  She had a recent lumbar MRI which I reviewed below.    PAST MEDICAL HISTORY:  Past Medical History:   Diagnosis Date    Diverticulosis of large intestine     High cholesterol     Non-Hodgkin lymphoma (HCC)     Osteoarthritis     Visual impairment     glases       SURGICAL HISTORY:  Past Surgical History:   Procedure Laterality Date    HIP SURGERY Right 08/18/2021    right total hip arthroplasty    NEEDLE BIOPSY LEFT         SOCIAL HISTORY:   Social History     Occupational History    Not on file   Tobacco Use    Smoking status: Former     Types: Cigarettes     Quit date: 2000     Years since quitting:  24.0    Smokeless tobacco: Never   Vaping Use    Vaping Use: Never used   Substance and Sexual Activity    Alcohol use: Yes     Alcohol/week: 1.0 - 2.0 standard drink of alcohol     Types: 1 - 2 Glasses of wine per week     Comment: weekly    Drug use: Never    Sexual activity: Not on file       CURRENT MEDICATIONS:   Current Outpatient Medications   Medication Sig Dispense Refill    celecoxib 100 MG Oral Cap celecoxib 100 mg capsule   Take 1 capsule every day by oral route for 30 days.      Acetaminophen (TYLENOL) 325 MG Oral Cap Tylenol 325 mg capsule   Take 1 capsule every day by oral route.      Rosuvastatin Calcium 10 MG Oral Tab rosuvastatin 10 mg tablet   TAKE ONE TABLET BY MOUTH ONE TIME DAILY      Cholecalciferol (VITAMIN D3) 50 MCG (2000 UT) Oral Tab Vitamin D3 50 mcg (2,000 unit) tablet   Take 1 tablet every day by oral route.      Cyanocobalamin (B-12 OR) Take by mouth every morning.        clotrimazole 1 % External Cream  (Patient not taking: Reported on 10/19/2023)         ALLERGIES:   Allergies   Allergen Reactions    Cephalexin HIVES and RASH    Sulfamethoxazole W/Trimethoprim HIVES and RASH    Other OTHER (SEE COMMENTS)     Muscle relaxer--don't know name increases heart rate    Mirtazapine OTHER (SEE COMMENTS)    Escitalopram NAUSEA ONLY       REVIEW OF SYSTEMS:   Patient-reported ROS  Constitutional  Sleep Disturbance: admits  Chills: denies  Fever: denies  Weight Gain: denies  Weight Loss: denies   Cardiovascular  Chest Pain: denies  Irregular Heartbeat: denies   Respiratory  Painful Breathing: denies  Wheezing: denies   Gastrointestinal  Bowel Incontinence: denies  Heartburn: denies  Abdominal Pain: denies  Blood in Stool : denies  Rectal Pain: denies   Hematology  Easy Bruising: denies  Easy Bleeding: denies   Genitourinary  Difficulty Urinating: denies  Bladder Incontinence: denies  Pelvic Pain: denies  Painful Urination: denies   Musculoskeletal  Joint Stiffness: admits  Painful Joints:  admits  Tailbone Pain: denies  Swollen Joints: denies   Peripheral Vascular  Swelling of Legs/Feet: denies  Cold Extremities: denies   Skin  Open Sores: denies  Nodules or Lumps: denies  Rash: denies   Neurological  Loss of Strength Since last Visit: denies  Tingling/Numbness: denies  Balance: denies   Psychiatric  Anxiety: denies  Depressed Mood: denies             PHYSICAL EXAM:   Pulse 66   Wt 155 lb (70.3 kg)   SpO2 97%   BMI 24.28 kg/m²     Body mass index is 24.28 kg/m².      General: No immediate distress, significant antalgic gait with decreased weightbearing on the left  Head: Normocephalic/ Atraumatic  Extremities: No lower extremity edema bilaterally. Peripheral pulses intact.   Spine:  painfree lumbar ROM in all directions  Hips: Limited hip internal and external rotation bilaterally, painless on the right and painful on the left.  Neuro:   Cognition: alert & oriented x 3, attentive, able to follow 2 step commands, comprehention intact, spontaneous speech intact  Strength: Lower extremities have 5/5 strength, except 4+/5 dorsiflexion strength bilaterally and 1/5 left great toe extension  Sensation: Decreased over the left L4 dermatome  Reflexes: Areflexic  SLR: neg bilaterally    Data    Radiology Imaging:  I personally reviewed a lumbar MRI showing multilevel disc degeneration at L3-4 but particularly at L4-5 where there is right lateral recess stenosis and foraminal narrowing.  There are also hypertrophic facets and significant atrophy of the paraspinal musculature.  A February 2023 CT of the abdomen pelvis shows fairly severe left hip arthritis.  Films are on care everywhere done at [facility] and unavailable for my personal review.      ASSESSMENT AND PLAN:  1. Lumbar radiculopathy  The patient has a long history of treatment and diagnostic injections.  According to our records, she had a 90% relief 15 minutes after a left L4 transforaminal injection.  Dr. Kaur has requested a left L5  transforaminal injection.  I think that is reasonable given the patient's great toe weakness and lateral recesses.  We will perform this diagnostically and therapeutically and report the results back to Dr. Kaur.  - Specialty Other Referral - In Network    2. Primary osteoarthritis of left hip  Both physical examination and recent CT indicate severe left hip arthritis.  It is possible she has 2 pain generators both of which are contributing.        RTC: Post injection      The patient was in agreement with the assessment and plan.  All questions were answered.        Roni Peralta MD  Physical Medicine and Rehabilitation/Sports Medicine  [facility]

## 2024-02-01 NOTE — TELEPHONE ENCOUNTER
Left L5 transforaminal epidural steroid injection  CPT 01993    Patient has been scheduled for Left L5 transforaminal epidural steroid injection on 2/15/24 at the [facility] with Dr. Peralta.   -Anesthesia type: Local.  -If scheduling [facility] covid testing required for all procedures whether patient is vaccinated or not.  -Patient informed not to eat or drink anything after midnight the night prior to the procedure, if being sedated. (For afternoon injections: Patient to fast 6-8 hours prior to procedure with IVCS/MAC. )  -Patient was advised that if he/she does receive the covid vaccine it needs to be at least 2 weeks before or after the injection.  -Medications and allergies reviewed.  -Patient reminded to hold NSAIDs (Ibuprofen, ASA 81, Aleve, Naproxen, Mobic, Diclofenac, Etodolac, Celebrex etc.) for 3 days prior to LUMBAR FACET JOINT INJECTIONS OR MEDIAL BRANCH BLOCK INJECTIONS  if BMI is greater than 35. For Cervical injections only hold multivitamins, Vitamin E, Fish Oil, Phentermine (Lomaira) for 7 days prior to injection and NSAIDS.   mg to be held for 7 days prior to injections.  -If patient is receiving MAC/IVCS Phentermine (Lomaira), Adlyxin (Lixisenatide), Bydureon BCise (Exenatide-release), Byetta (Exenatide), Mounjaro (Tirezepatide), Ozempic (Semaglutide), Rybelsus (oral demaglutide), Saxenda (Liraglutide), Trulicity (Dulaglutide), Victoriza (Liraglutide), Wegovy (Semaglutide), Berberine (oral natures ozempic) will need to be held for 7 days prior to injection.  -If patient's BMI is greater than 50. Patient is unable to get IVCS/MAC at [facility]. (Options: Patient can go to [facility] under MAC or ENDO under IVCS-reminder physician's slots at ENDO are limited. OR Patient can have the procedure done under local anesthesia at [facility] with Valium ( per physician's preference.)    -If on blood thinner clearance has been received to hold this medication by provider.   -Patient informed he/she will need a  to and from  procedure. EFFECTIVE 12/1/23- Per [facility]: \" Our PAT team will notify the patient that their ride MUST remain onsite for the entirety of their visit if the ride is unable to do so the procedure will be canceled. \"    -[facility] is located in the [facility] 1st floor. Patient may park in the yellow or purple parking.    Follow up appointment has been scheduled for patient on: 3/5/24    Patient verbalized understanding and agrees with plan.  -----> Scheduled in Epic: Yes  -----> Scheduled in Caset[employer]/Surgical Case Request: Yes

## 2024-03-05 ENCOUNTER — OFFICE VISIT (OUTPATIENT)
Dept: PHYSICAL MEDICINE AND REHAB | Facility: CLINIC | Age: 79
End: 2024-03-05
Payer: MEDICARE

## 2024-03-05 VITALS — BODY MASS INDEX: 25.9 KG/M2 | HEIGHT: 67 IN | WEIGHT: 165 LBS

## 2024-03-05 DIAGNOSIS — M48.062 LUMBAR STENOSIS WITH NEUROGENIC CLAUDICATION: ICD-10-CM

## 2024-03-05 DIAGNOSIS — M54.16 LUMBAR RADICULOPATHY: Primary | ICD-10-CM

## 2024-03-05 DIAGNOSIS — M16.12 PRIMARY OSTEOARTHRITIS OF LEFT HIP: ICD-10-CM

## 2024-03-05 PROCEDURE — 99213 OFFICE O/P EST LOW 20 MIN: CPT | Performed by: PHYSICAL MEDICINE & REHABILITATION

## 2024-03-05 NOTE — PROGRESS NOTES
[facility]  Progress Note    CHIEF COMPLAINT:    Chief Complaint   Patient presents with    Follow - Up     LOV 2024 Patient follows up on left L5 TFESI 2/15/24, admits to about 90% relief that lasted a few days, however pain is back to where it was prior to her injection. C/o increased stiffness in her low back, has trouble walking and standing from seated position. LOP 9/10       History of Present Illness:  Marlene H Weiler is a 78 year old female who presents today for follow up for symptoms of left leg pain.  I performed a left L5 transforaminal epidural injection on February 15 which resulted in 90% relief for a few days, despite that she was still limping but the pain was better.  This is similar to response to previous injections.  The injection was ordered diagnostic and therapeutic purposes by Dr. Carlos Kaur however the patient never got back to him with the results.  She also has fairly severe left hip arthritis and had seen Dr. Seymour for this in the past.    PAST MEDICAL HISTORY:  Past Medical History:   Diagnosis Date    Diverticulosis of large intestine     High cholesterol     Non-Hodgkin lymphoma (HCC)     Osteoarthritis     Visual impairment     veena       SURGICAL HISTORY:  Past Surgical History:   Procedure Laterality Date    HIP SURGERY Right 2021    right total hip arthroplasty    NEEDLE BIOPSY LEFT         SOCIAL HISTORY:   Social History     Occupational History    Not on file   Tobacco Use    Smoking status: Former     Types: Cigarettes     Quit date:      Years since quittin.    Smokeless tobacco: Never   Vaping Use    Vaping Use: Never used   Substance and Sexual Activity    Alcohol use: Yes     Alcohol/week: 1.0 - 2.0 standard drink of alcohol     Types: 1 - 2 Glasses of wine per week     Comment: weekly    Drug use: Never    Sexual activity: Not on file       CURRENT MEDICATIONS:   Current Outpatient Medications    Medication Sig Dispense Refill    celecoxib 100 MG Oral Cap celecoxib 100 mg capsule   Take 1 capsule every day by oral route for 30 days.      Acetaminophen (TYLENOL) 325 MG Oral Cap Tylenol 325 mg capsule   Take 1 capsule every day by oral route.      clotrimazole 1 % External Cream  (Patient not taking: Reported on 10/19/2023)      Rosuvastatin Calcium 10 MG Oral Tab rosuvastatin 10 mg tablet   TAKE ONE TABLET BY MOUTH ONE TIME DAILY      Cholecalciferol (VITAMIN D3) 50 MCG (2000 UT) Oral Tab Vitamin D3 50 mcg (2,000 unit) tablet   Take 1 tablet every day by oral route.      Cyanocobalamin (B-12 OR) Take by mouth every morning.           ALLERGIES:   Allergies   Allergen Reactions    Cephalexin HIVES and RASH    Sulfamethoxazole W/Trimethoprim HIVES and RASH    Other OTHER (SEE COMMENTS)     Muscle relaxer--don't know name increases heart rate    Mirtazapine OTHER (SEE COMMENTS)    Escitalopram NAUSEA ONLY           PHYSICAL EXAM:   Ht 67\"   Wt 165 lb (74.8 kg)   BMI 25.84 kg/m²     Body mass index is 25.84 kg/m².      Unchanged        Data    Radiology Imaging:  I reviewed a lumbar MRI showing multilevel disc degeneration at L3-4 but particularly at L4-5 where there is right lateral recess stenosis and foraminal narrowing.  There are also hypertrophic facets and significant atrophy of the paraspinal musculature.  A February 2023 CT of the abdomen pelvis shows fairly severe left hip arthritis.  [facility] for my personal review.  See      ASSESSMENT AND PLAN:  1. Lumbar radiculopathy  Although she does respond temporarily to lumbar transforaminal injections, her response to injections and lumbar anatomic pathology is somewhat underwhelming.  I recommend she follow-up with Dr. Kaur as she has been directed.    2. Primary osteoarthritis of left hip  In my opinion, her greatest pathology seems to be related to the left hip.  Therefore I recommended pool  therapy and consideration of a second orthopedic opinion for that          RTC as needed      The patient was in agreement with the assessment and plan.  All questions were answered.        Roni Peralta MD  Physical Medicine and Rehabilitation/Sports Medicine  [facility]

## 2024-03-14 ENCOUNTER — HOSPITAL ENCOUNTER (OUTPATIENT)
Dept: CT IMAGING | Facility: HOSPITAL | Age: 79
Discharge: HOME | End: 2024-03-14
Attending: ORTHOPAEDIC SURGERY
Payer: MEDICARE

## 2024-03-14 DIAGNOSIS — M43.16 SPONDYLOLISTHESIS OF LUMBAR REGION: ICD-10-CM

## 2024-03-14 PROCEDURE — 72131 CT LUMBAR SPINE W/O DYE: CPT | Performed by: ORTHOPAEDIC SURGERY

## 2024-05-20 ENCOUNTER — OFFICE VISIT (OUTPATIENT)
Dept: PHYSICAL MEDICINE AND REHAB | Facility: CLINIC | Age: 79
End: 2024-05-20

## 2024-05-20 VITALS
BODY MASS INDEX: 25.9 KG/M2 | HEIGHT: 67 IN | WEIGHT: 165 LBS | OXYGEN SATURATION: 97 % | RESPIRATION RATE: 18 BRPM | HEART RATE: 76 BPM

## 2024-05-20 DIAGNOSIS — M85.89 OSTEOPENIA OF MULTIPLE SITES: ICD-10-CM

## 2024-05-20 DIAGNOSIS — F41.1 GENERALIZED ANXIETY DISORDER: ICD-10-CM

## 2024-05-20 DIAGNOSIS — N18.2 STAGE 2 CHRONIC KIDNEY DISEASE: ICD-10-CM

## 2024-05-20 DIAGNOSIS — M48.062 LUMBAR STENOSIS WITH NEUROGENIC CLAUDICATION: Primary | ICD-10-CM

## 2024-05-20 DIAGNOSIS — Z96.641 HISTORY OF TOTAL HIP ARTHROPLASTY, RIGHT: ICD-10-CM

## 2024-05-20 DIAGNOSIS — M16.12 PRIMARY OSTEOARTHRITIS OF LEFT HIP: ICD-10-CM

## 2024-05-20 PROCEDURE — 99214 OFFICE O/P EST MOD 30 MIN: CPT | Performed by: PHYSICAL MEDICINE & REHABILITATION

## 2024-05-20 RX ORDER — DULOXETIN HYDROCHLORIDE 20 MG/1
20 CAPSULE, DELAYED RELEASE ORAL DAILY
Qty: 30 CAPSULE | Refills: 0 | Status: SHIPPED | OUTPATIENT
Start: 2024-05-20 | End: 2024-06-19

## 2024-05-20 RX ORDER — PSEUDOEPHEDRINE HCL 30 MG
100 TABLET ORAL DAILY
COMMUNITY
Start: 2024-04-22 | End: 2024-07-21

## 2024-05-20 NOTE — PROGRESS NOTES
[facility]  Progress Note    CHIEF COMPLAINT:    Chief Complaint   Patient presents with    Follow - Up     Pt is coming in to F/U after seeing Dr. Kaur, Pt states that she thinks she is a cannadit for surgery and is now seeing Dr. Peralta for pain management        History of Present Illness:  Marlene H Weiler is a 78 year old female who presents today for follow up for symptoms of left leg pain.  She has a complex milieu of left hip osteoarthritis and left lumbar radiculopathy.  She is on Celebrex and Tylenol.  She spoke with Dr. Kaur and is being sent for surgical optimization.  She is having second thoughts and wants to discuss \"pain management.\"     PAST MEDICAL HISTORY:  Past Medical History:    Diverticulosis of large intestine    High cholesterol    Non-Hodgkin lymphoma (HCC)    Osteoarthritis    Visual impairment    glases       SURGICAL HISTORY:  Past Surgical History:   Procedure Laterality Date    Hip surgery Right 2021    right total hip arthroplasty    Needle biopsy left         SOCIAL HISTORY:   Social History     Occupational History    Not on file   Tobacco Use    Smoking status: Former     Current packs/day: 0.00     Types: Cigarettes     Quit date:      Years since quittin.4    Smokeless tobacco: Never   Vaping Use    Vaping status: Never Used   Substance and Sexual Activity    Alcohol use: Yes     Alcohol/week: 1.0 - 2.0 standard drink of alcohol     Types: 1 - 2 Glasses of wine per week     Comment: weekly    Drug use: Never    Sexual activity: Not on file       CURRENT MEDICATIONS:   Current Outpatient Medications   Medication Sig Dispense Refill    docusate sodium 100 MG Oral Cap Take 100 mg by mouth daily.      DULoxetine (CYMBALTA) 20 MG Oral Cap DR Particles Take 1 capsule (20 mg total) by mouth daily. 30 capsule 0    celecoxib 100 MG Oral Cap celecoxib 100 mg capsule   Take 1 capsule every day by oral route for 30 days.       Acetaminophen (TYLENOL) 325 MG Oral Cap Tylenol 325 mg capsule   Take 1 capsule every day by oral route.      Rosuvastatin Calcium 10 MG Oral Tab rosuvastatin 10 mg tablet   TAKE ONE TABLET BY MOUTH ONE TIME DAILY      Cholecalciferol (VITAMIN D3) 50 MCG (2000 UT) Oral Tab Vitamin D3 50 mcg (2,000 unit) tablet   Take 1 tablet every day by oral route.      Cyanocobalamin (B-12 OR) Take by mouth every morning.        clotrimazole 1 % External Cream  (Patient not taking: Reported on 10/19/2023)         ALLERGIES:   Allergies   Allergen Reactions    Cephalexin HIVES and RASH    Sulfamethoxazole W/Trimethoprim HIVES and RASH    Other OTHER (SEE COMMENTS)     Muscle relaxer--don't know name increases heart rate    Mirtazapine OTHER (SEE COMMENTS)    Escitalopram NAUSEA ONLY           PHYSICAL EXAM:   Pulse 76   Resp 18   Ht 67\"   Wt 165 lb (74.8 kg)   SpO2 97%   BMI 25.84 kg/m²     Body mass index is 25.84 kg/m².      Unchanged        Data    Radiology Imaging:  I reviewed a lumbar MRI showing multilevel disc degeneration at L3-4 but particularly at L4-5 where there is right lateral recess stenosis and foraminal narrowing.  There are also hypertrophic facets and significant atrophy of the paraspinal musculature.  A February 2023 CT of the abdomen pelvis shows fairly severe left hip arthritis.  Films are on care everywhere done at [facility] and unavailable for my personal review.  See      ASSESSMENT AND PLAN:  1. Lumbar stenosis with neurogenic claudication  She seems to have disseminated arthritic and neurologic symptoms.  She is having second thoughts about surgery.  The only medicine I think would be reasonable for her is starting duloxetine 20 mg low-dose and gradually increasing to 60 mg over time.  She agreed to that and will return in 4 weeks.    2. Primary osteoarthritis of left hip  She also has significant left hip arthritis.    3. Generalized anxiety disorder  Negative comorbidity for painful  conditions.  Cymbalta might help.    4. Osteopenia of multiple sites  Avoiding steroids if possible    5. History of total hip arthroplasty, right  Stable    6. Stage 2 chronic kidney disease  Avoiding NSAIDs        RTC 4 weeks      The patient was in agreement with the assessment and plan.  All questions were answered.        Roni Peralta MD  Physical Medicine and Rehabilitation/Sports Medicine  [facility]

## 2024-06-12 ENCOUNTER — TELEPHONE (OUTPATIENT)
Dept: PHYSICAL MEDICINE AND REHAB | Facility: CLINIC | Age: 79
End: 2024-06-12

## 2024-06-12 NOTE — TELEPHONE ENCOUNTER
Spoke with patient and advised her to keep the appointment as recommended by Dr Peralta, also to discuss her medication as well since she started Duloxetine.     Patient verbalized understanding. Closing the encounter.

## 2024-06-12 NOTE — TELEPHONE ENCOUNTER
Pt would like to know if she should keep her appt for 6/18/24 - as she is scheduled for surgery on 7/1/24. He was treating her for pain management until she was able to get surgery. Please advise, thank you.

## 2024-06-18 ENCOUNTER — OFFICE VISIT (OUTPATIENT)
Dept: PHYSICAL MEDICINE AND REHAB | Facility: CLINIC | Age: 79
End: 2024-06-18

## 2024-06-18 VITALS — HEART RATE: 68 BPM | BODY MASS INDEX: 26 KG/M2 | WEIGHT: 165 LBS | OXYGEN SATURATION: 99 %

## 2024-06-18 DIAGNOSIS — Z96.641 HISTORY OF TOTAL HIP ARTHROPLASTY, RIGHT: ICD-10-CM

## 2024-06-18 DIAGNOSIS — N18.2 STAGE 2 CHRONIC KIDNEY DISEASE: ICD-10-CM

## 2024-06-18 DIAGNOSIS — M16.12 PRIMARY OSTEOARTHRITIS OF LEFT HIP: ICD-10-CM

## 2024-06-18 DIAGNOSIS — F41.1 GENERALIZED ANXIETY DISORDER: ICD-10-CM

## 2024-06-18 DIAGNOSIS — M48.062 LUMBAR STENOSIS WITH NEUROGENIC CLAUDICATION: Primary | ICD-10-CM

## 2024-06-18 DIAGNOSIS — M85.89 OSTEOPENIA OF MULTIPLE SITES: ICD-10-CM

## 2024-06-18 PROCEDURE — 99213 OFFICE O/P EST LOW 20 MIN: CPT | Performed by: PHYSICAL MEDICINE & REHABILITATION

## 2024-06-18 NOTE — PROGRESS NOTES
[facility]  Progress Note    CHIEF COMPLAINT:    Chief Complaint   Patient presents with    Follow - Up     24 LOV. She has been taking 20mg duloxetine daily. She is having surgery with Dr. Carlos carlton in 2 weeks. Pain 8/10. Denies t/n.        History of Present Illness:  Marlene H Weiler is a 78 year old female who presents today for follow up for symptoms of back and hip arthritis.  Last time I started her on Cymbalta 20 mg.  No difference in her symptoms.  A major development has been that her spine surgery has been moved up to .  She is going ahead with that.    PAST MEDICAL HISTORY:  Past Medical History:    Diverticulosis of large intestine    High cholesterol    Non-Hodgkin lymphoma (HCC)    Osteoarthritis    Visual impairment    glases       SURGICAL HISTORY:  Past Surgical History:   Procedure Laterality Date    Hip surgery Right 2021    right total hip arthroplasty    Needle biopsy left         SOCIAL HISTORY:   Social History     Occupational History    Not on file   Tobacco Use    Smoking status: Former     Current packs/day: 0.00     Types: Cigarettes     Quit date:      Years since quittin.4    Smokeless tobacco: Never   Vaping Use    Vaping status: Never Used   Substance and Sexual Activity    Alcohol use: Yes     Alcohol/week: 1.0 - 2.0 standard drink of alcohol     Types: 1 - 2 Glasses of wine per week     Comment: weekly    Drug use: Never    Sexual activity: Not on file       CURRENT MEDICATIONS:   Current Outpatient Medications   Medication Sig Dispense Refill    docusate sodium 100 MG Oral Cap Take 100 mg by mouth daily.      DULoxetine (CYMBALTA) 20 MG Oral Cap DR Particles Take 1 capsule (20 mg total) by mouth daily. 30 capsule 0    celecoxib 100 MG Oral Cap celecoxib 100 mg capsule   Take 1 capsule every day by oral route for 30 days.      Acetaminophen (TYLENOL) 325 MG Oral Cap Tylenol 325 mg capsule   Take 1  capsule every day by oral route.      clotrimazole 1 % External Cream  (Patient not taking: Reported on 10/19/2023)      Rosuvastatin Calcium 10 MG Oral Tab rosuvastatin 10 mg tablet   TAKE ONE TABLET BY MOUTH ONE TIME DAILY      Cholecalciferol (VITAMIN D3) 50 MCG (2000 UT) Oral Tab Vitamin D3 50 mcg (2,000 unit) tablet   Take 1 tablet every day by oral route.      Cyanocobalamin (B-12 OR) Take by mouth every morning.           ALLERGIES:   Allergies   Allergen Reactions    Cephalexin HIVES and RASH    Sulfamethoxazole W/Trimethoprim HIVES and RASH    Other OTHER (SEE COMMENTS)     Muscle relaxer--don't know name increases heart rate    Mirtazapine OTHER (SEE COMMENTS)    Escitalopram NAUSEA ONLY           PHYSICAL EXAM:   Pulse 68   Wt 165 lb (74.8 kg)   SpO2 99%   BMI 25.84 kg/m²     Body mass index is 25.84 kg/m².    Unchanged        ASSESSMENT AND PLAN:  1. Lumbar stenosis with neurogenic claudication  Will be having surgery on July 1.  Given that will drop Cymbalta.  I wished her the best and a full recovery from surgery.    2. Primary osteoarthritis of left hip  Significant.  Will address after recovery from spine surgery.    3. Generalized anxiety disorder  Negative for avidity for painful conditions    4. Osteopenia of multiple sites  Avoiding steroids    5. History of total hip arthroplasty, right  Stable    6. Stage 2 chronic kidney disease  Avoiding NSAIDs        RTC as needed      The patient was in agreement with the assessment and plan.  All questions were answered.        Roni Peralta MD  Physical Medicine and Rehabilitation/Sports Medicine  [facility]

## (undated) DEVICE — 3M™ TEGADERM™ TRANSPARENT FILM DRESSING, 1626W, 4 IN X 4-3/4 IN (10 CM X 12 CM), 50 EACH/CARTON, 4 CARTON/CASE: Brand: 3M™ TEGADERM™

## (undated) DEVICE — 3M™ STERI-DRAPE™ INCISE DRAPE 1050 (60CM X 45CM): Brand: STERI-DRAPE™

## (undated) DEVICE — KIT DRN 3/16IN PVC DRN 3 SPRG

## (undated) DEVICE — GAUZE SPONGES,12 PLY: Brand: CURITY

## (undated) DEVICE — 3M™ IOBAN™ 2 ANTIMICROBIAL INCISE DRAPE 6640EZ: Brand: IOBAN™ 2

## (undated) DEVICE — SUTURE MONOCRYL 3-0 Y936H

## (undated) DEVICE — SUTURE PDS II 1 CT-1

## (undated) DEVICE — 3M™ STERI-STRIP™ COMPOUND BENZOIN TINCTURE 40 BAGS/CARTON 4 CARTONS/CASE C1544: Brand: 3M™ STERI-STRIP™

## (undated) DEVICE — TRAY SKIN PREP PVP-1

## (undated) DEVICE — SUTURE VICRYL 0 CP-1

## (undated) DEVICE — SHEET,DRAPE,70X100,STERILE: Brand: MEDLINE

## (undated) DEVICE — Device

## (undated) DEVICE — GAMMEX® NON-LATEX PI ORTHO SIZE 9, STERILE POLYISOPRENE POWDER-FREE SURGICAL GLOVE: Brand: GAMMEX

## (undated) DEVICE — Device: Brand: STABLECUT®

## (undated) DEVICE — GAMMEX® PI HYBRID SIZE 9, STERILE POWDER-FREE SURGICAL GLOVE, POLYISOPRENE AND NEOPRENE BLEND: Brand: GAMMEX

## (undated) DEVICE — HOOD: Brand: FLYTE

## (undated) DEVICE — DRESSING BIOPATCH 1X4 CNTR

## (undated) DEVICE — PROXIMATE SKIN STAPLERS (35 WIDE) CONTAINS 35 STAINLESS STEEL STAPLES (FIXED HEAD): Brand: PROXIMATE

## (undated) DEVICE — CS5/5+ FASTPACK, 125ML, 150µ RES: Brand: HAEMONETICS CELL SAVER 5/5+ SYSTEMS

## (undated) DEVICE — 3M™ STERI-STRIP™ REINFORCED ADHESIVE SKIN CLOSURES, R1547, 1/2 IN X 4 IN (12 MM X 100 MM), 6 STRIPS/ENVELOPE: Brand: 3M™ STERI-STRIP™

## (undated) DEVICE — NEEDLE HPO 18GA 1.5IN ECLPS

## (undated) DEVICE — SOLUTION SURG DURA PREP HAZMAT

## (undated) DEVICE — 3 ML SYRINGE LUER-LOCK TIP: Brand: MONOJECT

## (undated) DEVICE — SOL  .9 3000ML

## (undated) DEVICE — 3M™ STERI-DRAPE™ PATIENT ISOLATION DRAPE 1014: Brand: STERI-DRAPE™

## (undated) DEVICE — HOOD, PEEL-AWAY: Brand: FLYTE

## (undated) DEVICE — PACK CDS TOTAL HIP

## (undated) DEVICE — SPLINT ORTH UNV HIP PD CUP LG

## (undated) DEVICE — SUCTION CANISTER, 3000CC,SAFELINER: Brand: DEROYAL

## (undated) DEVICE — SOL  .9 1000ML BTL

## (undated) DEVICE — TOWEL SURG OR 17X30IN BLUE

## (undated) DEVICE — 450 ML BOTTLE OF 0.05% CHLORHEXIDINE GLUCONATE IN 99.95% STERILE WATER FOR IRRIGATION, USP AND APPLICATOR.: Brand: IRRISEPT ANTIMICROBIAL WOUND LAVAGE

## (undated) DEVICE — 3M™ MEDITPORE™ SOFT CLOTH TAPE 6 IN X 10 YD 12 ROLLS/CASE 2966: Brand: 3M™ MEDIPORE™

## (undated) NOTE — LETTER
AUTHORIZATION FOR SURGICAL OPERATION OR OTHER PROCEDURE    1. I hereby authorize Dr. Mireya Hyatt and the [facility] staff assigned to my case to perform the following operation and/or procedure at the [facility]:    Left hip joint steroid injection under US guidance    2. My physician has explained the nature and purpose of the operation or other procedure, possible alternative methods of treatment, the risks involved, and the possibility of complication to me. I acknowledge that no guarantee has been made as to the result that may be obtained. 3.  I recognize that, during the course of this operation, or other procedure, unforseen conditions may necessitate additional or different procedure than those listed above. I, therefore, further authorize and request that the above named physician, his/her physician assistants or designees perform such procedures as are, in his/her professional opinion, necessary and desirable. 4.  Any tissue or organs removed in the operation or other procedure may be disposed of by and at the discretion of the [facility] staff and [facility]. 5.  I understand that in the event of a medical emergency, I will be transported by local paramedics to [facility] or other hospital emergency department. 6.  I certify that I have read and fully understand the above consent to operation and/or other procedure. 7.  I acknowledge that my physician has explained sedation/analgesia administration to me including the risks and benefits. I consent to the administration of sedation/analgesia as may be necessary or desirable in the judgement of my physician. Witness signature: ___________________________________________________ Date:  ______/______/_____                    Time:  ________ A. M.  P.M.        Patient Name:  Maxine Brooke  9/25/1945  [id number]       Patient signature:  ___________________________________________________                   Statement of Physician  My signature below affirms that prior to the time of the procedure, I have explained to the patient and/or his/her guardian, the risks and benefits involved in the proposed treatment and any reasonable alternative to the proposed treatment. I have also explained the risks and benefits involved in the refusal of the proposed treatment and have answered the patient's questions.                         Date:  ______/______/_______  Provider                      Signature:  __________________________________________________________       Time:  ___________ A.M    P.M.

## (undated) NOTE — Clinical Note
Dear Carlos,  I had the opportunity to see your patient Marlene H Weiler recently. I appreciate your confidence in me to care for your patients. Please feel free call me with any questions at [phone] or contact me through Epic.  Sincerely, Missael Peralta MD Board Certified, Physical Medicine and Rehabilitation Specializing in Sports Medicine, Spine Medicine and Electrodiagnostic Medicine [facility]

## (undated) NOTE — LETTER
[address], [facility]   Date:   3/17/2022     Name:   Sushil Nation    YOB: 1945   MRN:   [id number]       WHERE IS YOUR PAIN NOW? Omayra the areas on your body where you feel the described sensations. Use the appropriate symbol. Anjana Avila the areas of radiation. Include all affected areas. Just to complete the picture, please draw in the face. ACHE:  ^ ^ ^   NUMBNESS:  0000   PINS & NEEDLES:  = = = =                              ^ ^ ^                       0000              = = = =                                    ^ ^ ^                       0000            = = = =      BURNING:  XXXX   STABBING: ////                  XXXX                ////                         XXXX          ////     Please omayra the line below indicating your degree of pain right now  with 0 being no pain 10 being the worst pain possible.                                          0             1             2              3             4              5              6              7             8             9             10         Patient Signature:

## (undated) NOTE — LETTER
Date: May 31, 2022      Patient Name: Mauri Heard      : 1945        Thank you for choosing [facility] as your health care provider. Your physician has deemed the following medical service(s) necessary. However, your insurance plan may not pay for all of your health care and costs and may deny payment for this service. The fact that your insurance plan does not pay for an item or service does not mean you should not receive it. The purpose of this form is to help you make an informed decision about whether or not you want to receive this service(s) that may not be paid for by your insurance plan. CPT Code Description     Cost                         Left hip joint steroid injection under US guidance        $1500      _________ ______________________________ _____________      _________ ______________________________ _____________      I understand that the above mentioned service(s) or supply may not be covered by my insurance company.  I agree to be financially responsible for the cost of this service or supply in the event of my insurance denies payment as a non-covered benefit.        ______________________________________________________________________  Signature of Patient or Patient's Representative  Relationship  Date    ______________________________________________________________________  Signature of Witness to signing of form   Printed Name

## (undated) NOTE — LETTER
[address], [facility]   Date:   4/21/2022     Name:   Maxine Brooke    YOB: 1945   MRN:   [id number]       WHERE IS YOUR PAIN NOW? Hardeep the areas on your body where you feel the described sensations. Use the appropriate symbol. [place] the areas of radiation. Include all affected areas. Just to complete the picture, please draw in the face. ACHE:  ^ ^ ^   NUMBNESS:  0000   PINS & NEEDLES:  = = = =                              ^ ^ ^                       0000              = = = =                                    ^ ^ ^                       0000            = = = =      BURNING:  XXXX   STABBING: ////                  XXXX                ////                         XXXX          ////     Please hardeep the line below indicating your degree of pain right now  with 0 being no pain 10 being the worst pain possible.                                          0             1             2              3             4              5              6              7             8             9             10         Patient Signature:

## (undated) NOTE — IP AVS SNAPSHOT
Patient Demographics     Address  [address] [place] [address] Phone  [phone] [facility])  [phone] (Mobile) *Preferred*      Emergency Contact(s)     Name Relation Home Work Mobile    [place] Spouse  [phone]       Allergies as o post-operative blood clots. Use a soft bristled tooth brush and electric razor while on the blood thinner. Monitor for signs and symptoms of increased bleeding which include nose bleeds that do not cease, excessive bruising, blood in stool or urine.  If the sent to [facility] - TIM Palumbo [address] [place] [address], [address] [place] [address] Road [phone], [phone]  [address] [place] [address]    Phone: [phone]   apixaban 2.5 MG Tabs     Please  your prescriptions at the location directed by your doc Most Recent Value   Patient Weight  64 kg (141 lb)         Lab Results Last 24 Hours      Basic Metabolic Panel (8) [[id number]] (Abnormal)  Resulted: 08/23/21 0748, Result status: Final result   Ordering provider: Jim Willis MD  08/22/21 2300 Resultin [facility] ([facility])   .0 150.0 - 450.0 10(3)uL — [facility])            Testing Performed By     Lab - 10 Megan Lyons Name Director Address Valid Date Range    [address] [facility]) [place] [facility] ([facility]) Kathya Montez.  Can who told her that while her MRI did show lumbar spinal stenosis, there was no evidence of any nerve irritation and that her symptoms are coming from her hip.   Her work-up included an MRI OF THE RIGHT HIP performed at [facility] on Zee 3, 2021 the christiano HIVES, RASH  Sulfamethoxazole W/*    HIVES, RASH     HISTORY:  Past Medical History:  History reviewed. No pertinent past medical history. Surgical History:  History reviewed. No pertinent surgical history.    Social History:  Social History    Tobacco as inferior acetabular osteophytes present. Some generalized bone demineralization is seen. No acute bone changes are definitively present. Note: Details of the x-rays were shown and explained to the patient.          Assessment & Plan:       ASSESSMENT arthroplasty, pelvic obliquity, etc.); the 2-8% incidence of hip dislocation, even at tertiary centers; mechanical loosening, mechanical wear and/or osteolysis (with increased incidence in younger patients, patients with larger body habitus and in patients surgery would address specifically any pain emanating from her advanced osteoarthritis of the hip. She may experience exacerbating symptoms emanating from her lumbar stenosis/spine.     It was also explained to the patient that no guarantees could be given Recommendation:  LACE > 58: High Risk of readmission after discharge from the hospital.      Admitting Diagnosis: osteoathritis right hip  Primary osteoarthritis of right hip    Disposition: Home    Discharge Diagnosis: . Principal Problem:    Osteoarthriti HYDROcodone-acetaminophen (NORCO) 7.5-325 MG Oral Tab  Take 1 tablet by mouth every 6 (six) hours as needed for Pain. Home Meds - Modified    apixaban 2.5 MG Oral Tab  Take 1 tablet (2.5 mg total) by mouth 2 (two) times daily.       [address] [phone]   [facility], [facility] [address] [address]    Phone: [phone]   · apixaban 2.5 MG Tabs     Please  your prescriptions at the location directed by your doctor or nurse    Bring a paper prescription for each of these medications  · A pneumonia. Change mepilex silver dressing every 3 days and as needed. Risk for Bleeding-You are on a blood thinner to prevent post-operative blood clots. Use a soft bristled tooth brush and electric razor while on the blood thinner.  Monitor for signs throughout the session. Pt is min A with bed mobility and to transfer in and out of the bed. Pt required extra time to perform task due to increased pain and weakness. Pt required some assist to scoot to the EOB.  Pt sat EOB for a few minutes and denied any Static Sitting: Good  Dynamic Sitting: Fair +  Static Standing: Fair -  Dynamic Standing: Poor +[RM. 2]  ACTIVITY TOLERANCE                         O2 WALK       AM-PAC '6-Clicks' INPATIENT SHORT FORM - BASIC MOBILITY  How much difficulty does the patient c stairs/one curb w/ assistive device and supervision   Goal #4   Current Status NT    Goal #5 Patient verbalizes and/or demonstrates all precautions and safety concerns independently   Goal #5   Current Status Educated   Goal #6 Patient independently perfor weakness. Pt with small JOSÉ MIGUEL and but with step through gait pattern. Returned pt back to the room and  to the bed. Per MD wanted pt back in the bed to change pat's dressing. Pt is min A back to supine.  Pt is repositioned and left in the bed with all needs w Moving from lying on back to sitting on the side of the bed?: A Little   How much help from another person does the patient currently need. ..   -   Moving to and from a bed to a chair (including a wheelchair)?: A Little   -   Need to walk in hospital room? 1:41 PM               Physical Therapy Note signed by Christianne Portillo PTA at 8/21/2021  3:21 PM  Version 1 of 1    Author: Christianne Portillo PTA Service: Rehab Author Type: Physical Therapist    Filed: 8/21/2021  3:21 PM Date of Service: 8/21/2021  3:10 training;Balance training[DK. 2]    SUBJECTIVE  I feel weak    OBJECTIVE[DK.1]  Precautions: REANNA - posterior[DK. 2]    WEIGHT BEARING RESTRICTION[DK.1]  Weight Bearing Restriction: R lower extremity        R Lower Extremity: Weight Bearing as Tolerated[DK. 2] : inc pain and fatigue with amb[DK. 2]    Additional information:     THERAPEUTIC EXERCISES  Lower Extremity Ankle pumps  Heel slides  Knee extension  Leg slides  Quad sets     Position Sitting and Supine[DK.1]       Patient End of Session: Up in chair; Fami TREATMENT NOTE - INPATIENT    Room Number: 407/407-A         Presenting Problem:  (R franco)     Problem List  Principal Problem:    Osteoarthritis of right hip  Active Problems:    Follicular non-Hodgkin's lymphoma ([address]    Hyperlipidemia    Primary osteoart transfer training;UE strengthening/ROM; Endurance training;Patient/Family education;Patient/Family training;Equipment eval/education; Compensatory technique education    SUBJECTIVE[MS.1]  \"I'm sorry for being so crabby with you. Thank you for your help. \"[M session/findings; All patient questions and concerns addressed; Alarm set (RN in room )    OT Goals:    Patient self-stated goal is: to return home      Patient will complete LE dressing with ae/supervision  Comment:[MS.1] Ongoing[MS.2]     Patient will comp home, WBAT on right leg.  - Maintain hip precautions and use abductor splint when laying down sleeping at night.   - Monitor incision for any signs of infection or bleeding.  - May use ice to incision to prevent swelling.  - Oral anticoagulation.  - Prevent

## (undated) NOTE — LETTER
WHERE IS YOUR PAIN NOW?  Omayra the areas on your body where you feel the described sensations.  Use the appropriate symbol.  Omayra the areas of radiation.  Include all affected areas.  Just to complete the picture, please draw in the face.     ACHE:  ^ ^ ^   NUMBNESS:  0000   PINS & NEEDLES:  = = = =                              ^ ^ ^                       0000              = = = =                                    ^ ^ ^                       0000            = = = =      BURNING:  XXXX   STABBING: ////                  XXXX                ////                         XXXX          ////     Please omayra the line below indicating your degree of pain right now  with 0 being no pain 10 being the worst pain possible.                                         0             1             2              3             4              5              6              7             8             9             10         Patient Signature:

## (undated) NOTE — Clinical Note
Dear Carlos,  I had the opportunity to see your patient Marlene H Weiler recently. I appreciate your confidence in me to care for your patients. Please feel free call me with any questions at [phone] or contact me through Epic.  Sincerely, Missael Peralta MD Board Certified, Physical Medicine and Rehabilitation Specializing in Sports Medicine, Spine Medicine and Electrodiagnostic Medicine [facility]

## (undated) NOTE — IP AVS SNAPSHOT
[facility]            (For Outpatient Use Only) Initial Admit Date: 8/18/2021   Inpt/Obs Admit Date: Inpt: 8/18/21 / Obs: N/A   Discharge Date:    Skip Milad:  [de-identified]   MRN: [de-identified]   CSN: [id number]   CEID: [id number]        [employer] Insurance Type:    Subscriber Name:  Subscriber :    Subscriber ID:  Pt Rel to Subscriber:    Hospital Account Financial Class: Medicare    2021

## (undated) NOTE — LETTER
[facility]   Date:   1/30/2024     Name:   Marlene H Weiler    YOB: 1945   MRN:   [id number]       WHERE IS YOUR PAIN NOW?  Omayra the areas on your body where you feel the described sensations.  Use the appropriate symbol.  Omayra the areas of radiation.  Include all affected areas.  Just to complete the picture, please draw in the face.     ACHE:  ^ ^ ^   NUMBNESS:  0000   PINS & NEEDLES:  = = = =                              ^ ^ ^                       0000              = = = =                                    ^ ^ ^                       0000            = = = =      BURNING:  XXXX   STABBING: ////                  XXXX                ////                         XXXX          ////     Please omayra the line below indicating your degree of pain right now  with 0 being no pain 10 being the worst pain possible.                                         0             1             2              3             4              5              6              7             8             9             10         Patient Signature: